# Patient Record
Sex: MALE | Race: BLACK OR AFRICAN AMERICAN | NOT HISPANIC OR LATINO | Employment: FULL TIME | URBAN - METROPOLITAN AREA
[De-identification: names, ages, dates, MRNs, and addresses within clinical notes are randomized per-mention and may not be internally consistent; named-entity substitution may affect disease eponyms.]

---

## 2019-03-12 ENCOUNTER — OFFICE VISIT - HEALTHEAST (OUTPATIENT)
Dept: FAMILY MEDICINE | Facility: CLINIC | Age: 54
End: 2019-03-12

## 2019-03-12 DIAGNOSIS — H81.10 BENIGN PAROXYSMAL POSITIONAL VERTIGO, UNSPECIFIED LATERALITY: ICD-10-CM

## 2019-03-12 DIAGNOSIS — R42 DIZZINESS: ICD-10-CM

## 2019-03-12 LAB
ANION GAP SERPL CALCULATED.3IONS-SCNC: 12 MMOL/L (ref 5–18)
ATRIAL RATE - MUSE: 49 BPM
BASOPHILS # BLD AUTO: 0 THOU/UL (ref 0–0.2)
BASOPHILS NFR BLD AUTO: 0 % (ref 0–2)
BUN SERPL-MCNC: 14 MG/DL (ref 8–22)
CHLORIDE BLD-SCNC: 102 MMOL/L (ref 98–107)
CO2 SERPL-SCNC: 28 MMOL/L (ref 22–31)
CREAT SERPL-MCNC: 0.7 MG/DL (ref 0.8–1.5)
DIASTOLIC BLOOD PRESSURE - MUSE: NORMAL MMHG
EOSINOPHIL # BLD AUTO: 0.2 THOU/UL (ref 0–0.4)
EOSINOPHIL NFR BLD AUTO: 3 % (ref 0–6)
ERYTHROCYTE [DISTWIDTH] IN BLOOD BY AUTOMATED COUNT: 12.4 % (ref 11–14.5)
GLUCOSE BLD-MCNC: 82 MG/DL (ref 70–125)
HCT VFR BLD AUTO: 43.6 % (ref 40–54)
HGB BLD-MCNC: 15 G/DL (ref 14–18)
INTERPRETATION ECG - MUSE: NORMAL
LYMPHOCYTES # BLD AUTO: 1.8 THOU/UL (ref 0.8–4.4)
LYMPHOCYTES NFR BLD AUTO: 30 % (ref 20–40)
MCH RBC QN AUTO: 30.6 PG (ref 27–34)
MCHC RBC AUTO-ENTMCNC: 34.4 G/DL (ref 32–36)
MCV RBC AUTO: 89 FL (ref 80–100)
MONOCYTES # BLD AUTO: 0.5 THOU/UL (ref 0–0.9)
MONOCYTES NFR BLD AUTO: 9 % (ref 2–10)
NEUTROPHILS # BLD AUTO: 3.6 THOU/UL (ref 2–7.7)
NEUTROPHILS NFR BLD AUTO: 58 % (ref 50–70)
P AXIS - MUSE: 34 DEGREES
PLATELET # BLD AUTO: 205 THOU/UL (ref 140–440)
PMV BLD AUTO: 8.7 FL (ref 7–10)
POTASSIUM BLD-SCNC: 4 MMOL/L (ref 3.5–5.5)
PR INTERVAL - MUSE: 166 MS
QRS DURATION - MUSE: 88 MS
QT - MUSE: 432 MS
QTC - MUSE: 390 MS
R AXIS - MUSE: -9 DEGREES
RBC # BLD AUTO: 4.89 MILL/UL (ref 4.4–6.2)
SODIUM SERPL-SCNC: 142 MMOL/L (ref 136–145)
SYSTOLIC BLOOD PRESSURE - MUSE: NORMAL MMHG
T AXIS - MUSE: 6 DEGREES
VENTRICULAR RATE- MUSE: 49 BPM
WBC: 6.2 THOU/UL (ref 4–11)

## 2019-03-12 RX ORDER — ACETAMINOPHEN 325 MG/1
650 TABLET ORAL EVERY 6 HOURS PRN
Status: SHIPPED | COMMUNITY
Start: 2019-03-12 | End: 2021-09-24

## 2019-03-21 ENCOUNTER — OFFICE VISIT - HEALTHEAST (OUTPATIENT)
Dept: FAMILY MEDICINE | Facility: CLINIC | Age: 54
End: 2019-03-21

## 2019-03-21 DIAGNOSIS — Z12.11 SCREEN FOR COLON CANCER: ICD-10-CM

## 2019-03-21 DIAGNOSIS — R42 VERTIGO: ICD-10-CM

## 2019-03-21 ASSESSMENT — MIFFLIN-ST. JEOR: SCORE: 1589.32

## 2021-06-02 ENCOUNTER — RECORDS - HEALTHEAST (OUTPATIENT)
Dept: ADMINISTRATIVE | Facility: CLINIC | Age: 56
End: 2021-06-02

## 2021-06-02 VITALS — WEIGHT: 175 LBS | BODY MASS INDEX: 26.52 KG/M2 | HEIGHT: 68 IN

## 2021-06-02 VITALS — WEIGHT: 171.38 LBS

## 2021-06-17 NOTE — PATIENT INSTRUCTIONS - HE
Patient Instructions by Sacha Reyez PA-C at 3/12/2019 10:00 AM     Author: Sacha Reyez PA-C Service: -- Author Type: Physician Assistant    Filed: 3/12/2019 11:07 AM Encounter Date: 3/12/2019 Status: Addendum    : Sacha Reyez PA-C (Physician Assistant)    Related Notes: Original Note by Sacha Reyez PA-C (Physician Assistant) filed at 3/12/2019 11:07 AM       Take Antivert medication as prescribed.  Follow-up with the vertigo clinic for treatment.  Establish care with primary care provider by making an appointment before leaving the office today.  Return to walk-in care or with your primary care provider if not getting good resolution of your symptoms or if new concerns arise.      Patient Education     Benign Paroxysmal Positional Vertigo     Your health care provider may move your head in certain ways to treat your BPPV.     Benign paroxysmal positional vertigo (BPPV) is a problem with the inner ear. The inner ear contains the vestibular system. This system is what helps you keep your balance. BPPV causes a feeling of spinning. It is a common problem of the vestibular system.  Understanding the vestibular system  The vestibular system of the ear is made up of very tiny parts. They include the utricle, saccule, and semicircular canals. The utricle is a tiny organ that contains calcium crystals. In some people, the crystals can move into the semicircular canals. When this happens, the system no longer works as it should. This causes BPPV. Benign means it is not life threatening. Paroxysmal means it happens suddenly. Positional means that it happens when you move your head. Vertigo is a feeling of spinning.  What causes BPPV?  Causes include injury to your head or neck. Other problems with the vestibular system may cause BPPV. In many people, the cause of BPPV is not known.  Symptoms of BPPV  You many have repeated feelings of spinning (vertigo). The vertigo usually lasts less than 1 minute. Some  movements, such as rolling over in bed, can bring on vertigo.  Diagnosing BPPV  Your primary healthcare provider may diagnose and treat your BPPV. Or you may see an ear, nose, and throat doctor (otolaryngologist). In some cases, you may see a nervous system doctor (neurologist).  The healthcare provider will ask about your symptoms and your medical history. He or she will examine you. You may have hearing and balance tests. As part of the exam, your healthcare provider may have you move your head and body in certain ways. If you have BPPV, the movements can bring on vertigo. Your provider will also look for abnormal movements of your eyes. You may have other tests to check your vestibular or nervous systems.  Treatment for BPPV  Your healthcare provider may try to move the calcium crystals. This is done by having you move your head and neck in certain ways. This treatment is safe and often works well. You may also be told to do these movements at home. You may still have vertigo for a few weeks. Your healthcare provider will recheck your symptoms, usually in about a month. Special physical therapy may also be part of treatment. In rare cases, surgery may be needed for BPPV that does not go away.     When to call the healthcare provider  Call your healthcare provider right away if you have any of these:    Symptoms that do not go away with treatment    Symptoms that get worse    New symptoms   Date Last Reviewed: 5/1/2017 2000-2017 [employer]. [address] [place] [address]. All rights reserved. This information is not intended as a substitute for professional medical care. Always follow your healthcare professional's instructions.           Patient Education     Dizziness (Uncertain Cause)  Dizziness is a common symptom. It may be described as lightheadedness, spinning, or feeling like you are going to faint. Dizziness can have many causes.  Be sure to tell the healthcare provider  about:    All medicines you take, including prescription, over-the-counter, herbs, and supplements    Any other symptoms you have    Any health problems you are being treated for    Any past major health problems you've had, such as a heart attack, balance issues, hearing problems, or blood pressure problems    Anything that causes the dizziness to get worse or better  Today's exam did not show an exact cause for your dizziness. Other tests may be needed. Follow up with your healthcare provider.  Home care    Dizziness that occurs with sudden standing may be a sign of mild dehydration. Drink extra fluids for the next few days.    If you recently started a new medicine, stopped a medicine, or had the dose of a current medicine changed, talk with the prescribing healthcare provider. Your medicine plan may need adjustment.    If dizziness lasts more than a few seconds, sit or lie down until it passes. This may help prevent injury in case you pass out. Get up slowly when you feel better.    Don't drive or use power tools or dangerous equipment until you have had no dizziness for at least 48 hours.  Follow-up care  Follow up with your healthcare provider for further evaluation within the next 7 days or as advised.  When to seek medical advice  Call your healthcare provider for any of the following:    Worsening of symptoms or new symptoms    Passing out or seizure    Repeated vomiting    Headache    Palpitations (the sense that your heart is fluttering or beating fast or hard)    Shortness of breath    Blood in vomit or stool (black or red color)    Weakness of an arm or leg or 1 side of the face    Vision or hearing changes    Trouble walking or speaking    Chest, arm, neck, back, or jaw pain  Date Last Reviewed: 11/1/2017 2000-2017 [employer]. [address] [place] [address]. All rights reserved. This information is not intended as a substitute for professional medical care. Always follow  your healthcare professional's instructions.

## 2021-06-18 NOTE — LETTER
Letter by Sacha Reyez PA-C at      Author: Sacha Reyez PA-C Service: -- Author Type: --    Filed:  Encounter Date: 3/12/2019 Status: (Other)       March 12, 2019     Patient: Kaitlin Dejesus   YOB: 1965   Date of Visit: 3/12/2019       To Whom It May Concern:    It is my medical opinion that Kaitlin Dejesus may return to work on 03/14/2019.    If you have any questions or concerns, please don't hesitate to call.    Sincerely,        Electronically signed by Sacha Reyez PA-C

## 2021-06-25 NOTE — PROGRESS NOTES
1. Vertigo     2. Screen for colon cancer  Ambulatory referral for Colonoscopy        Plan: He is doing much better, so I did not refill his meclizine.  He did not think he needed a refill of it.  I told him that is not terribly uncommon for him to have a few symptoms still at this point but over the next couple of weeks that should resolve completely.  If it seems to be getting worse or not improving he will let me know.    I encouraged him to come on in some point in the next couple months and have a full physical fasting so we can do some blood work.    Subjective: 53-year-old male new patient to our clinic who is here today to establish care and to follow-up from [facility].  He was seen a couple of weeks ago at [facility] and he was given some meclizine for some vertigo.  They thought it was a benign vertigo and indeed he has gotten much better.  The meclizine somewhat helped his symptoms but he thinks he is just gotten better given time.  He still once in a while will have symptoms when he quickly turns his head but overall he is 90% better.    He really has no other concerns or problems today.  We reviewed his history and documented things in the chart.    Patient is new patient to the clinic. Please see past medical history, surgical history, social history and family history, all of which were completed in their entirety today.     A comprehensive Review of Systems was performed, and other than the positives mentioned above, the ROS was negative.       Objective: Well-appearing male in no acute distress.  Vital signs as noted.  He really does not get vertiginous with positional challenges.  He is ears are clear eyes and nose are normal.  Oropharynx is clear.  Chest clear to auscultation.  Heart regular rate and rhythm.

## 2021-06-27 NOTE — PROGRESS NOTES
Progress Notes by Sacha Reyez PA-C at 3/12/2019 10:00 AM     Author: Sacha Reyez PA-C Service: -- Author Type: Physician Assistant    Filed: 3/12/2019  2:47 PM Encounter Date: 3/12/2019 Status: Signed    : Sacha Reyez PA-C (Physician Assistant)       Subjective:      Patient ID: Kaitlin Dejesus is a 53 y.o. male.    Chief Complaint:    HPI  Kaitlin Dejesus is a 53 y.o. male who presents today complaining of dizziness and vomiting since last night. He had one bout of emesis last night.  He has not had emesis today.  Is not had a fever.  No cardiac symptoms to include chest pain arm or jaw pain shortness of breath dyspnea on exertion.  She has not had difficulty with dizziness in the past.  No known history of hypertension or hyperlipidemia.     Vertigo  Patient presents for evaluation of dizziness. The symptoms started yesterday and have essentially resolved. The attacks occur occasionally and last a few seconds. Positions that worsen symptoms: turning head. Previous workup/treatments: none. Associated ear symptoms: none. Associated CNS symptoms: none. Recent infections: none. Head trauma: denied. Drug ingestion: none and caffiene. Noise exposure: no occupational exposure. Family history: None.    He has not tried treatment for this at home.    He does not drink very much water during the day.  However, he is not been describing dizziness with changing position from lying to sitting to standing.    No past medical history on file.    No past surgical history on file.    No family history on file.    Social History     Tobacco Use   ? Smoking status: Never Smoker   ? Smokeless tobacco: Never Used   Substance Use Topics   ? Alcohol use: Not on file   ? Drug use: Not on file       Review of Systems  As above in HPI, otherwise balance of Review of Systems are negative.    Objective:     /80 (Patient Site: Right Arm, Patient Position: Sitting, Cuff Size: Adult Regular)   Pulse (!) 53   Temp  98.3  F (36.8  C) (Oral)   Resp 16   Wt 171 lb 6 oz (77.7 kg)   SpO2 97%     Physical Exam  General: Patient is resting comfortably no acute distress is afebrile  HEENT: Head is normocephalic atraumatic   eyes are PERRL EOMI sclera anicteric   TMs are clear bilaterally  Throat is with mild pharyngeal wall erythema and no exudate  No cervical lymphadenopathy present  LUNGS: Clear to auscultation bilaterally  HEART: Regular rate and rhythm  Skin: Without rash non-diaphoretic  Neuro: Cranial nerves II through XII are grossly intact.  There is both direct and consensual stimulation to light.  No noted nystagmus.  Ears are clear.  He has good finger to nose and heel to villagomez testing.  Romberg is negative.  No focal neurologic deficits noted    Lab:  Recent Results (from the past 24 hour(s))   ISTAT CHEM 7 (HE CLINIC ONLY)   Result Value Ref Range    Sodium 142 136 - 145 mmol/L    Potassium 4.0 3.5 - 5.5 mmol/L    CO2 28 22 - 31 mmol/L    Chloride 102 98 - 107 mmol/L    Anion Gap, Calculation 12 5 - 18 mmol/L    Glucose 82 70 - 125 mg/dL    BUN 14 8 - 22 mg/dL    Creatinine 0.70 (L) 0.80 - 1.50 mg/dL   HM1 (CBC with Diff)   Result Value Ref Range    WBC 6.2 4.0 - 11.0 thou/uL    RBC 4.89 4.40 - 6.20 mill/uL    Hemoglobin 15.0 14.0 - 18.0 g/dL    Hematocrit 43.6 40.0 - 54.0 %    MCV 89 80 - 100 fL    MCH 30.6 27.0 - 34.0 pg    MCHC 34.4 32.0 - 36.0 g/dL    RDW 12.4 11.0 - 14.5 %    Platelets 205 140 - 440 thou/uL    MPV 8.7 7.0 - 10.0 fL    Neutrophils % 58 50 - 70 %    Lymphocytes % 30 20 - 40 %    Monocytes % 9 2 - 10 %    Eosinophils % 3 0 - 6 %    Basophils % 0 0 - 2 %    Neutrophils Absolute 3.6 2.0 - 7.7 thou/uL    Lymphocytes Absolute 1.8 0.8 - 4.4 thou/uL    Monocytes Absolute 0.5 0.0 - 0.9 thou/uL    Eosinophils Absolute 0.2 0.0 - 0.4 thou/uL    Basophils Absolute 0.0 0.0 - 0.2 thou/uL   Electrocardiogram Perform - Clinic   Result Value Ref Range    SYSTOLIC BLOOD PRESSURE  mmHg    DIASTOLIC BLOOD PRESSURE  mmHg     VENTRICULAR RATE 49 BPM    ATRIAL RATE 49 BPM    P-R INTERVAL 166 ms    QRS DURATION 88 ms    Q-T INTERVAL 432 ms    QTC CALCULATION (BEZET) 390 ms    P Axis 34 degrees    R AXIS -9 degrees    T AXIS 6 degrees    MUSE DIAGNOSIS       Sinus bradycardia  Leftward axis  Minimal voltage criteria for LVH, may be normal variant  Borderline ECG  No previous ECGs available  Confirmed by DEVON MARTÍNEZ MD LOC:[facility] ([id number]) on 3/12/2019 12:06:34 PM         Assessment:     Procedures    The primary encounter diagnosis was Dizziness. A diagnosis of Benign paroxysmal positional vertigo, unspecified laterality was also pertinent to this visit.    Plan:     1. Dizziness  HM1(CBC and Differential)    ISTAT CHEM 7 (HE CLINIC ONLY)    Electrocardiogram Perform - Clinic    HM1 (CBC with Diff)    meclizine (ANTIVERT) 12.5 mg tablet   2. Benign paroxysmal positional vertigo, unspecified laterality  Ambulatory referral to Adult PT- Internal    meclizine (ANTIVERT) 12.5 mg tablet       Advised patient that we will treat him with Antivert for a BPPV type picture since he is having dizziness with turning of his head.  He has not had any other cardiac symptoms to report.  His EKG appeared to be normal.  Requested he try the Antivert, have treatment and evaluation by the physical therapy vertigo clinic and will establish care with primary care provider to recheck his progress.  He can return to the walk-in care in the interim if any complication or new concerns arise.    Patient Instructions     Take Antivert medication as prescribed.  Follow-up with the vertigo clinic for treatment.  Establish care with primary care provider by making an appointment before leaving the office today.  Return to walk-in care or with your primary care provider if not getting good resolution of your symptoms or if new concerns arise.      Patient Education     Benign Paroxysmal Positional Vertigo     Your health care provider may move your head in certain ways to  treat your BPPV.     Benign paroxysmal positional vertigo (BPPV) is a problem with the inner ear. The inner ear contains the vestibular system. This system is what helps you keep your balance. BPPV causes a feeling of spinning. It is a common problem of the vestibular system.  Understanding the vestibular system  The vestibular system of the ear is made up of very tiny parts. They include the utricle, saccule, and semicircular canals. The utricle is a tiny organ that contains calcium crystals. In some people, the crystals can move into the semicircular canals. When this happens, the system no longer works as it should. This causes BPPV. Benign means it is not life threatening. Paroxysmal means it happens suddenly. Positional means that it happens when you move your head. Vertigo is a feeling of spinning.  What causes BPPV?  Causes include injury to your head or neck. Other problems with the vestibular system may cause BPPV. In many people, the cause of BPPV is not known.  Symptoms of BPPV  You many have repeated feelings of spinning (vertigo). The vertigo usually lasts less than 1 minute. Some movements, such as rolling over in bed, can bring on vertigo.  Diagnosing BPPV  Your primary healthcare provider may diagnose and treat your BPPV. Or you may see an ear, nose, and throat doctor (otolaryngologist). In some cases, you may see a nervous system doctor (neurologist).  The healthcare provider will ask about your symptoms and your medical history. He or she will examine you. You may have hearing and balance tests. As part of the exam, your healthcare provider may have you move your head and body in certain ways. If you have BPPV, the movements can bring on vertigo. Your provider will also look for abnormal movements of your eyes. You may have other tests to check your vestibular or nervous systems.  Treatment for BPPV  Your healthcare provider may try to move the calcium crystals. This is done by having you move your  head and neck in certain ways. This treatment is safe and often works well. You may also be told to do these movements at home. You may still have vertigo for a few weeks. Your healthcare provider will recheck your symptoms, usually in about a month. Special physical therapy may also be part of treatment. In rare cases, surgery may be needed for BPPV that does not go away.     When to call the healthcare provider  Call your healthcare provider right away if you have any of these:    Symptoms that do not go away with treatment    Symptoms that get worse    New symptoms   Date Last Reviewed: 5/1/2017 2000-2017 [employer]. [address] [place] [address]. All rights reserved. This information is not intended as a substitute for professional medical care. Always follow your healthcare professional's instructions.           Patient Education     Dizziness (Uncertain Cause)  Dizziness is a common symptom. It may be described as lightheadedness, spinning, or feeling like you are going to faint. Dizziness can have many causes.  Be sure to tell the healthcare provider about:    All medicines you take, including prescription, over-the-counter, herbs, and supplements    Any other symptoms you have    Any health problems you are being treated for    Any past major health problems you've had, such as a heart attack, balance issues, hearing problems, or blood pressure problems    Anything that causes the dizziness to get worse or better  Today's exam did not show an exact cause for your dizziness. Other tests may be needed. Follow up with your healthcare provider.  Home care    Dizziness that occurs with sudden standing may be a sign of mild dehydration. Drink extra fluids for the next few days.    If you recently started a new medicine, stopped a medicine, or had the dose of a current medicine changed, talk with the prescribing healthcare provider. Your medicine plan may need adjustment.    If  dizziness lasts more than a few seconds, sit or lie down until it passes. This may help prevent injury in case you pass out. Get up slowly when you feel better.    Don't drive or use power tools or dangerous equipment until you have had no dizziness for at least 48 hours.  Follow-up care  Follow up with your healthcare provider for further evaluation within the next 7 days or as advised.  When to seek medical advice  Call your healthcare provider for any of the following:    Worsening of symptoms or new symptoms    Passing out or seizure    Repeated vomiting    Headache    Palpitations (the sense that your heart is fluttering or beating fast or hard)    Shortness of breath    Blood in vomit or stool (black or red color)    Weakness of an arm or leg or 1 side of the face    Vision or hearing changes    Trouble walking or speaking    Chest, arm, neck, back, or jaw pain  Date Last Reviewed: 11/1/2017 2000-2017 [employer]. [address] [place] [address], [place] [address]. All rights reserved. This information is not intended as a substitute for professional medical care. Always follow your healthcare professional's instructions.

## 2021-09-07 ENCOUNTER — OFFICE VISIT (OUTPATIENT)
Dept: FAMILY MEDICINE | Facility: CLINIC | Age: 56
End: 2021-09-07
Payer: COMMERCIAL

## 2021-09-07 VITALS
BODY MASS INDEX: 30.17 KG/M2 | WEIGHT: 176.7 LBS | HEIGHT: 64 IN | SYSTOLIC BLOOD PRESSURE: 100 MMHG | HEART RATE: 59 BPM | OXYGEN SATURATION: 96 % | DIASTOLIC BLOOD PRESSURE: 78 MMHG

## 2021-09-07 DIAGNOSIS — B35.6 TINEA OF GROIN: ICD-10-CM

## 2021-09-07 DIAGNOSIS — Z22.7 LATENT TUBERCULOSIS BY SKIN TEST: Primary | ICD-10-CM

## 2021-09-07 LAB
ALBUMIN SERPL-MCNC: 4 G/DL (ref 3.5–5)
ALP SERPL-CCNC: 92 U/L (ref 45–120)
ALT SERPL W P-5'-P-CCNC: 20 U/L (ref 0–45)
AST SERPL W P-5'-P-CCNC: 23 U/L (ref 0–40)
BILIRUB DIRECT SERPL-MCNC: 0.2 MG/DL
BILIRUB SERPL-MCNC: 0.7 MG/DL (ref 0–1)
PROT SERPL-MCNC: 7.2 G/DL (ref 6–8)

## 2021-09-07 PROCEDURE — 80076 HEPATIC FUNCTION PANEL: CPT | Performed by: FAMILY MEDICINE

## 2021-09-07 PROCEDURE — 36415 COLL VENOUS BLD VENIPUNCTURE: CPT | Performed by: FAMILY MEDICINE

## 2021-09-07 PROCEDURE — 99214 OFFICE O/P EST MOD 30 MIN: CPT | Performed by: FAMILY MEDICINE

## 2021-09-07 RX ORDER — ISONIAZID 100 MG/1
100 TABLET ORAL DAILY
Status: CANCELLED | OUTPATIENT
Start: 2021-09-07

## 2021-09-07 RX ORDER — RIFAMPIN 150 MG/1
150 CAPSULE ORAL DAILY
Qty: 30 CAPSULE | Refills: 0 | Status: SHIPPED | OUTPATIENT
Start: 2021-09-07 | End: 2021-09-24

## 2021-09-07 RX ORDER — KETOCONAZOLE 20 MG/G
CREAM TOPICAL 2 TIMES DAILY
Qty: 60 G | Refills: 1 | Status: SHIPPED | OUTPATIENT
Start: 2021-09-07 | End: 2022-01-11

## 2021-09-07 RX ORDER — ISONIAZID 300 MG/1
300 TABLET ORAL DAILY
Qty: 30 TABLET | Refills: 0 | Status: SHIPPED | OUTPATIENT
Start: 2021-09-07 | End: 2021-09-24

## 2021-09-07 RX ORDER — BETAMETHASONE VALERATE 1.2 MG/G
CREAM TOPICAL 2 TIMES DAILY
Qty: 45 G | Refills: 1 | Status: SHIPPED | OUTPATIENT
Start: 2021-09-07 | End: 2022-01-11

## 2021-09-07 ASSESSMENT — ENCOUNTER SYMPTOMS
LIGHT-HEADEDNESS: 0
UNEXPECTED WEIGHT CHANGE: 0
CHILLS: 0
WHEEZING: 0
SHORTNESS OF BREATH: 0
HEADACHES: 0
FATIGUE: 0
COUGH: 0

## 2021-09-07 ASSESSMENT — MIFFLIN-ST. JEOR: SCORE: 1537.51

## 2021-09-07 NOTE — LETTER
September 8, 2021      KeyannaAlina Dejesus  [address] [place] [id number]        Dear ,    We are writing to inform you of your test results.    liver function tests were normal.       Resulted Orders   Hepatic panel (Albumin, ALT, AST, Bili, Alk Phos, TP)   Result Value Ref Range    Bilirubin Total 0.7 0.0 - 1.0 mg/dL    Bilirubin Direct 0.2 <=0.5 mg/dL    Protein Total 7.2 6.0 - 8.0 g/dL    Albumin 4.0 3.5 - 5.0 g/dL    Alkaline Phosphatase 92 45 - 120 U/L    AST 23 0 - 40 U/L    ALT 20 0 - 45 U/L       If you have any questions or concerns, please call the clinic at the number listed above.       Sincerely,      Layo Bo MD

## 2021-09-07 NOTE — PROGRESS NOTES
"    Assessment & Plan     Latent tuberculosis by skin test  - XR Chest 2 Views  - Med Therapy Management Referral  - Hepatic panel (Albumin, ALT, AST, Bili, Alk Phos, TP)  - isoniazid (NYDRAZID) 300 MG tablet  Dispense: 30 tablet; Refill: 0  - rifampin (RIFADIN) 150 MG capsule  Dispense: 30 capsule; Refill: 0    Tinea of groin  - ketoconazole (NIZORAL) 2 % external cream  Dispense: 60 g; Refill: 1  - betamethasone valerate (VALISONE) 0.1 % external cream  Dispense: 45 g; Refill: 1  I did start him on isoniazid as well as rifampin.  Will have him come back in 1 month for a recheck.  I will also have him do a chest x-ray and check his liver function.  I did put in a referral for MTM for monitoring of the isoniazid as well as the rifampin.  The rash that he has appears to be tinea and I am going to go ahead and have him treated with ketoconazole as well as betamethasone.  We will recheck it again when he comes in for his a follow-up.  But he will let me know if there is any changes or if there is no improvement.  BMI:   Estimated body mass index is 30.33 kg/m  as calculated from the following:    Height as of this encounter: 1.626 m (5' 4\").    Weight as of this encounter: 80.2 kg (176 lb 11.2 oz).           No follow-ups on file.    Layo Bo MD  [facility]   Kaitlin is a 55 year old who presents for the following health issues     HPI    He comes in today having done a Mantoux for TB screening which came back positive.  He noted that he has had Mantoux in the past and that had always been negative.  He noted no cough, he does not have any increased sweating does not have night sweats.  He has had no weight loss and no hemoptysis.  Noted that he thinks that he must have received BCG when he was younger.  He was informed to come in for management.  He is also having some skin rash noted on the left axillary region as well as on the groin on both sides.  Noted no " itching but has had this going on for some time now.  Noted to have that managed and treated.    Family History   Problem Relation Age of Onset     Diabetes No family hx of      Heart Disease No family hx of      Cancer No family hx of       Social History     Socioeconomic History     Marital status:      Spouse name: Not on file     Number of children: Not on file     Years of education: Not on file     Highest education level: Not on file   Occupational History     Not on file   Tobacco Use     Smoking status: Never Smoker     Smokeless tobacco: Never Used   Substance and Sexual Activity     Alcohol use: No     Drug use: No     Sexual activity: Yes     Partners: Female   Other Topics Concern     Not on file   Social History Narrative     Not on file     Social Determinants of Health     Financial Resource Strain:      Difficulty of Paying Living Expenses:    Food Insecurity:      Worried About Running Out of Food in the Last Year:      Ran Out of Food in the Last Year:    Transportation Needs:      Lack of Transportation (Medical):      Lack of Transportation (Non-Medical):    Physical Activity:      Days of Exercise per Week:      Minutes of Exercise per Session:    Stress:      Feeling of Stress :    Social Connections:      Frequency of Communication with Friends and Family:      Frequency of Social Gatherings with Friends and Family:      Attends Orthodoxy Services:      Active Member of Clubs or Organizations:      Attends Club or Organization Meetings:      Marital Status:    Intimate Partner Violence:      Fear of Current or Ex-Partner:      Emotionally Abused:      Physically Abused:      Sexually Abused:       No past surgical history on file.   No past medical history on file.     Review of Systems   Constitutional: Negative for chills, fatigue and unexpected weight change.   HENT: Negative.    Respiratory: Negative for cough, shortness of breath and wheezing.    Cardiovascular: Negative for  "chest pain.   Skin: Positive for rash.   Neurological: Negative for light-headedness and headaches.   All other systems reviewed and are negative.           Objective    /78 (BP Location: Left arm, Patient Position: Sitting, Cuff Size: Adult Regular)   Pulse 59   Ht 1.626 m (5' 4\")   Wt 80.2 kg (176 lb 11.2 oz)   SpO2 96%   BMI 30.33 kg/m    Body mass index is 30.33 kg/m .  Physical Exam  Constitutional:       Appearance: Normal appearance.   Cardiovascular:      Pulses: Normal pulses.   Pulmonary:      Effort: Pulmonary effort is normal.   Abdominal:      General: Abdomen is flat.   Musculoskeletal:      Cervical back: Normal range of motion.   Skin:     General: Skin is warm.      Comments: He has a dried looking flaky and rounded skin rash noted on the medial aspect of the upper arm involving the area of the biceps.  He also has same on the groin region bilaterally.   Neurological:      Mental Status: He is alert.   Psychiatric:         Mood and Affect: Mood normal.        "

## 2021-09-08 ENCOUNTER — ANCILLARY PROCEDURE (OUTPATIENT)
Dept: GENERAL RADIOLOGY | Facility: CLINIC | Age: 56
End: 2021-09-08
Attending: FAMILY MEDICINE
Payer: COMMERCIAL

## 2021-09-08 DIAGNOSIS — Z22.7 LATENT TUBERCULOSIS BY SKIN TEST: ICD-10-CM

## 2021-09-08 PROCEDURE — 71046 X-RAY EXAM CHEST 2 VIEWS: CPT | Mod: TC | Performed by: RADIOLOGY

## 2021-09-24 ENCOUNTER — OFFICE VISIT (OUTPATIENT)
Dept: PHARMACY | Facility: CLINIC | Age: 56
End: 2021-09-24
Attending: FAMILY MEDICINE
Payer: COMMERCIAL

## 2021-09-24 DIAGNOSIS — Z22.7 LATENT TUBERCULOSIS BY SKIN TEST: ICD-10-CM

## 2021-09-24 DIAGNOSIS — Z22.7 LATENT TUBERCULOSIS: Primary | ICD-10-CM

## 2021-09-24 PROCEDURE — 99207 PR NO CHARGE LOS: CPT | Performed by: PHARMACIST

## 2021-09-24 RX ORDER — ISONIAZID 300 MG/1
300 TABLET ORAL DAILY
Qty: 30 TABLET | Refills: 2 | Status: SHIPPED | OUTPATIENT
Start: 2021-09-24 | End: 2021-12-28

## 2021-09-24 RX ORDER — RIFAMPIN 150 MG/1
150 CAPSULE ORAL DAILY
Qty: 30 CAPSULE | Refills: 2 | Status: SHIPPED | OUTPATIENT
Start: 2021-09-24 | End: 2021-12-28

## 2021-09-24 NOTE — PROGRESS NOTES
Patient presents for medication monitoring of treatment of latent tuberculosis. He was prescribed three month regimen of daily dosing of isoniazid with rifampin (3HR). This is month one out of three.  Patient verifies taking medication as prescribed, with no missed doses so far.  Patient denies any side effects from the medication and these were reviewed as listed below along with other teaching points.  Patient verifies understanding importance of completing regimen. Scheduled follow-up in 1 month.    POTENTIAL SIDE EFFECTS:   Poor appetite (INH/RIF)  Nausea/vomiting (INH/RIF)  RUQ abdominal tenderness (INH/RIF)  Tea/coffee colored urine (INH/RIF)  Unusual fatigue (INH/RIF)  Rash/itching (INH/RIF)  Yellow skin/eyes (INH/RIF)  Numbness/tingling in arms/legs (INH)  Fever for 3 days (INH/RIF)    TEACHING:  Notify MD/nurse if side effects  Review signs/symptoms of active TB disease  Avoid alcohol use.  Take meds without food (better absorption)  Effect on hormonal contraceptives (RIF)  Orange urine/tears are normal (RIF)  Importance of notifying providers if moving  Importance to complete full regimen      Return in about 1 month (around 10/24/2021).    Karin Ge, PharmD, BCACP  Medication Management (MTM) Pharmacist  [facility]     Current Outpatient Medications   Medication     acetaminophen (TYLENOL) 325 MG tablet     betamethasone valerate (VALISONE) 0.1 % external cream     isoniazid (NYDRAZID) 300 MG tablet     ketoconazole (NIZORAL) 2 % external cream     rifampin (RIFADIN) 150 MG capsule     No current facility-administered medications for this visit.

## 2021-10-20 NOTE — PROGRESS NOTES
Patient presents for medication monitoring of treatment of latent tuberculosis. He was prescribed three month regimen of daily dosing of isoniazid with rifampin (3HR). This is month two out of three.  Patient verifies taking medication as prescribed, with no missed doses so far.  Patient denies any side effects from the medication and these were reviewed as listed below along with other teaching points.  Patient verifies understanding importance of completing regimen. Scheduled follow-up in 1 month.     POTENTIAL SIDE EFFECTS:   Poor appetite (INH/RIF)  Nausea/vomiting (INH/RIF)  RUQ abdominal tenderness (INH/RIF)  Tea/coffee colored urine (INH/RIF)  Unusual fatigue (INH/RIF)  Rash/itching (INH/RIF)  Yellow skin/eyes (INH/RIF)  Numbness/tingling in arms/legs (INH)  Fever for 3 days (INH/RIF)    TEACHING:  Notify MD/nurse if side effects  Review signs/symptoms of active TB disease  Avoid alcohol use.  Take meds without food (better absorption)  Effect on hormonal contraceptives (RIF)  Orange urine/tears are normal (RIF)  Importance of notifying providers if moving  Importance to complete full regimen      Return in about 1 month (around 11/22/2021).    Karin Ge, PharmD, BCACP  Medication Management (MTM) Pharmacist  [facility]     Current Outpatient Medications   Medication     betamethasone valerate (VALISONE) 0.1 % external cream     isoniazid (NYDRAZID) 300 MG tablet     ketoconazole (NIZORAL) 2 % external cream     rifampin (RIFADIN) 150 MG capsule     No current facility-administered medications for this visit.

## 2021-10-22 ENCOUNTER — OFFICE VISIT (OUTPATIENT)
Dept: PHARMACY | Facility: CLINIC | Age: 56
End: 2021-10-22
Payer: COMMERCIAL

## 2021-10-22 ENCOUNTER — OFFICE VISIT (OUTPATIENT)
Dept: FAMILY MEDICINE | Facility: CLINIC | Age: 56
End: 2021-10-22
Payer: COMMERCIAL

## 2021-10-22 VITALS
SYSTOLIC BLOOD PRESSURE: 120 MMHG | WEIGHT: 180.5 LBS | BODY MASS INDEX: 30.81 KG/M2 | DIASTOLIC BLOOD PRESSURE: 80 MMHG | OXYGEN SATURATION: 97 % | HEIGHT: 64 IN | HEART RATE: 62 BPM

## 2021-10-22 VITALS
SYSTOLIC BLOOD PRESSURE: 120 MMHG | DIASTOLIC BLOOD PRESSURE: 80 MMHG | OXYGEN SATURATION: 97 % | HEART RATE: 62 BPM | WEIGHT: 180.5 LBS | BODY MASS INDEX: 30.98 KG/M2

## 2021-10-22 DIAGNOSIS — Z22.7 LATENT TUBERCULOSIS BY SKIN TEST: Primary | ICD-10-CM

## 2021-10-22 DIAGNOSIS — Z22.7 LATENT TUBERCULOSIS: Primary | ICD-10-CM

## 2021-10-22 DIAGNOSIS — Z23 TETANUS-DIPHTHERIA (TD) VACCINATION: ICD-10-CM

## 2021-10-22 LAB
ALBUMIN SERPL-MCNC: 4.1 G/DL (ref 3.5–5)
ALP SERPL-CCNC: 88 U/L (ref 45–120)
ALT SERPL W P-5'-P-CCNC: 20 U/L (ref 0–45)
AST SERPL W P-5'-P-CCNC: 25 U/L (ref 0–40)
BILIRUB DIRECT SERPL-MCNC: 0.2 MG/DL
BILIRUB SERPL-MCNC: 0.5 MG/DL (ref 0–1)
PROT SERPL-MCNC: 7.6 G/DL (ref 6–8)

## 2021-10-22 PROCEDURE — 99207 PR NO CHARGE LOS: CPT | Performed by: PHARMACIST

## 2021-10-22 PROCEDURE — 90714 TD VACC NO PRESV 7 YRS+ IM: CPT | Performed by: FAMILY MEDICINE

## 2021-10-22 PROCEDURE — 99212 OFFICE O/P EST SF 10 MIN: CPT | Mod: 25 | Performed by: FAMILY MEDICINE

## 2021-10-22 PROCEDURE — 36415 COLL VENOUS BLD VENIPUNCTURE: CPT | Performed by: FAMILY MEDICINE

## 2021-10-22 PROCEDURE — 80076 HEPATIC FUNCTION PANEL: CPT | Performed by: FAMILY MEDICINE

## 2021-10-22 PROCEDURE — 90471 IMMUNIZATION ADMIN: CPT | Performed by: FAMILY MEDICINE

## 2021-10-22 ASSESSMENT — ENCOUNTER SYMPTOMS
HEARTBURN: 0
CONSTITUTIONAL NEGATIVE: 1
ABDOMINAL PAIN: 0
DIARRHEA: 0
COUGH: 0
ADENOPATHY: 0

## 2021-10-22 ASSESSMENT — MIFFLIN-ST. JEOR: SCORE: 1554.74

## 2021-10-22 NOTE — PROGRESS NOTES
"  Assessment & Plan     (Z22.7) Latent tuberculosis by skin test  (primary encounter diagnosis)  Comment: He will continue to take the isoniazid as well as the rifampicin for at least 3 months.  But he is doing well at this time.  I will look at getting his liver function.  Plan: Hepatic panel (Albumin, ALT, AST, Bili, Alk         Phos, TP)      (Z23) Tetanus-diphtheria (Td) vaccination  Comment: He does need tetanus vaccination.  This will be done today.  Plan: TD PRESERV FREE, IM (7+ YRS) (DECAVAC/TENIVAC)          BMI:   Estimated body mass index is 30.98 kg/m  as calculated from the following:    Height as of this encounter: 1.626 m (5' 4\").    Weight as of this encounter: 81.9 kg (180 lb 8 oz).           No follow-ups on file.    Layo Bo MD  [facility] DANI Madrigal is a 55 year old who presents for the following health issues     HPI   He comes in today to follow-up.  He is following up with me with the [facility] pharmacist Karin Ge.  He has been treated for latent tuberculosis.  He is not taking isoniazid and rifampin.  He noted no side effects to the medications.  He has had no cough or difficulty breathing in the past.  Today he feels well.  He does need to have his tetanus shot updated.    Family History   Problem Relation Age of Onset     Diabetes No family hx of      Heart Disease No family hx of      Cancer No family hx of       Social History     Socioeconomic History     Marital status:      Spouse name: Not on file     Number of children: Not on file     Years of education: Not on file     Highest education level: Not on file   Occupational History     Not on file   Tobacco Use     Smoking status: Never Smoker     Smokeless tobacco: Never Used   Substance and Sexual Activity     Alcohol use: No     Drug use: No     Sexual activity: Yes     Partners: Female   Other Topics Concern     Not on file   Social History Narrative     Not on file " "    Social Determinants of Health     Financial Resource Strain:      Difficulty of Paying Living Expenses:    Food Insecurity:      Worried About Running Out of Food in the Last Year:      Ran Out of Food in the Last Year:    Transportation Needs:      Lack of Transportation (Medical):      Lack of Transportation (Non-Medical):    Physical Activity:      Days of Exercise per Week:      Minutes of Exercise per Session:    Stress:      Feeling of Stress :    Social Connections:      Frequency of Communication with Friends and Family:      Frequency of Social Gatherings with Friends and Family:      Attends Oriental orthodox Services:      Active Member of Clubs or Organizations:      Attends Club or Organization Meetings:      Marital Status:    Intimate Partner Violence:      Fear of Current or Ex-Partner:      Emotionally Abused:      Physically Abused:      Sexually Abused:       No past surgical history on file.   No past medical history on file.       Review of Systems   Constitutional: Negative.    Respiratory: Negative for cough.    Cardiovascular: Negative for chest pain.   Gastrointestinal: Negative for abdominal pain, diarrhea and heartburn.   Hematological: Negative for adenopathy.            Objective    /80 (BP Location: Left arm, Patient Position: Sitting, Cuff Size: Adult Regular)   Pulse 62   Ht 1.626 m (5' 4\")   Wt 81.9 kg (180 lb 8 oz)   SpO2 97%   BMI 30.98 kg/m    Body mass index is 30.98 kg/m .  Physical Exam  Vitals reviewed.   Constitutional:       Appearance: Normal appearance.   Cardiovascular:      Rate and Rhythm: Normal rate and regular rhythm.      Pulses: Normal pulses.   Pulmonary:      Effort: Pulmonary effort is normal.      Breath sounds: Normal breath sounds.   Abdominal:      General: Abdomen is flat. There is no distension.      Tenderness: There is no abdominal tenderness.   Neurological:      Mental Status: He is alert.                "

## 2021-11-19 ENCOUNTER — VIRTUAL VISIT (OUTPATIENT)
Dept: PHARMACY | Facility: CLINIC | Age: 56
End: 2021-11-19
Payer: COMMERCIAL

## 2021-11-19 DIAGNOSIS — Z22.7 LATENT TUBERCULOSIS: Primary | ICD-10-CM

## 2021-11-19 PROCEDURE — 99207 PR NO CHARGE LOS: CPT | Performed by: PHARMACIST

## 2021-11-19 NOTE — PROGRESS NOTES
Patient presents for medication monitoring of treatment of latent tuberculosis. He was prescribed three month regimen of daily dosing of isoniazid with rifampin (3HR). This is month three out of three. Last month, LFTs were within normal range. Patient verifies taking medication as prescribed, with no missed doses.  Patient denies any side effects from the medication and these were reviewed as listed below along with other teaching points.  Patient verifies understanding importance of completing regimen.     POTENTIAL SIDE EFFECTS:   Poor appetite (INH/RIF)  Nausea/vomiting (INH/RIF)  RUQ abdominal tenderness (INH/RIF)  Tea/coffee colored urine (INH/RIF)  Unusual fatigue (INH/RIF)  Rash/itching (INH/RIF)  Yellow skin/eyes (INH/RIF)  Numbness/tingling in arms/legs (INH)  Fever for 3 days (INH/RIF)    TEACHING:  Notify MD/nurse if side effects  Review signs/symptoms of active TB disease  Avoid alcohol use.  Take meds without food (better absorption)  Effect on hormonal contraceptives (RIF)  Orange urine/tears are normal (RIF)  Importance of notifying providers if moving  Importance to complete full regimen    Return in about 1 month (around 12/19/2021).    Karin Ge, PharmD, BCACP  Medication Management (MTM) Pharmacist  [facility]     Current Outpatient Medications   Medication     betamethasone valerate (VALISONE) 0.1 % external cream     isoniazid (NYDRAZID) 300 MG tablet     ketoconazole (NIZORAL) 2 % external cream     rifampin (RIFADIN) 150 MG capsule     No current facility-administered medications for this visit.

## 2021-12-26 NOTE — PROGRESS NOTES
Clinical Pharmacy Consult:                                                    Kaitlin Dejesus is a 56 year old male coming in for a clinical pharmacist consult.  He was referred to me from Dr. Bo.     Reason for Consult: Latent TB medication monitoring    Patient presents for medication monitoring of treatment of latent tuberculosis. He was prescribed three month regimen of daily dosing of isoniazid with rifampin (3HR). He has now completed treatment. Patient verifies taking medication as prescribed, with no missed doses. LFTs were within normal range on treatment with no adverse effects.       Karin Ge, PharmD, BCACP  Medication Management (MTM) Pharmacist  [facility]

## 2021-12-28 ENCOUNTER — OFFICE VISIT (OUTPATIENT)
Dept: PHARMACY | Facility: CLINIC | Age: 56
End: 2021-12-28
Payer: COMMERCIAL

## 2021-12-28 DIAGNOSIS — Z22.7 LATENT TUBERCULOSIS: Primary | ICD-10-CM

## 2021-12-28 PROCEDURE — 99207 PR NO CHARGE LOS: CPT | Performed by: PHARMACIST

## 2022-01-11 ENCOUNTER — OFFICE VISIT (OUTPATIENT)
Dept: FAMILY MEDICINE | Facility: CLINIC | Age: 57
End: 2022-01-11
Payer: COMMERCIAL

## 2022-01-11 VITALS
DIASTOLIC BLOOD PRESSURE: 62 MMHG | BODY MASS INDEX: 27.37 KG/M2 | HEIGHT: 68 IN | SYSTOLIC BLOOD PRESSURE: 124 MMHG | WEIGHT: 180.6 LBS | HEART RATE: 60 BPM

## 2022-01-11 DIAGNOSIS — Z00.00 ROUTINE MEDICAL EXAM: Primary | ICD-10-CM

## 2022-01-11 PROCEDURE — 99396 PREV VISIT EST AGE 40-64: CPT | Performed by: FAMILY MEDICINE

## 2022-01-11 ASSESSMENT — MIFFLIN-ST. JEOR: SCORE: 1613.7

## 2022-01-11 NOTE — PROGRESS NOTES
"  ASSESSMENT/PLAN:   (Z00.00) Routine medical exam  (primary encounter diagnosis)  Comment: Generally the patient is doing very well.  We discussed healthy lifestyles, including adequate exercise (3-4 times a week for 20-30 minutes), and a healthy diet.  Patient should return for annual physicals, and we can also see them here as needed.     Plan: He is not fasting today so we will set up some blood work to be done when he is able to come in for a lab visit.    Patient has been advised of split billing requirements and indicates understanding: Yes  COUNSELING:   Reviewed preventive health counseling, as reflected in patient instructions       Regular exercise       Healthy diet/nutrition    Estimated body mass index is 30.98 kg/m  as calculated from the following:    Height as of 10/22/21: 1.626 m (5' 4\").    Weight as of 10/22/21: 81.9 kg (180 lb 8 oz).         He reports that he has never smoked. He has never used smokeless tobacco.        SUBJECTIVE:   CC: Kaitlin Dejesus is an 56 year old male who presents for preventative health visit.       Patient has been advised of split billing requirements and indicates understanding: Yes  Healthy Habits:     Getting at least 3 servings of Calcium per day:  Yes    Bi-annual eye exam:  NO    Dental care twice a year:  Yes    Sleep apnea or symptoms of sleep apnea:  None    Diet:  Regular (no restrictions)    Frequency of exercise:  2-3 days/week    Duration of exercise:  15-30 minutes    Taking medications regularly:  Yes    Medication side effects:  None    PHQ-2 Total Score: 0    Additional concerns today:  Yes              Today's PHQ-2 Score:   PHQ-2 ( 1999 [employer]) 1/11/2022   Q1: Little interest or pleasure in doing things 0   Q2: Feeling down, depressed or hopeless 0   PHQ-2 Score 0   Q1: Little interest or pleasure in doing things Not at all   Q2: Feeling down, depressed or hopeless Not at all   PHQ-2 Score 0       Abuse: Current or Past(Physical, Sexual or " Emotional)- No  Do you feel safe in your environment? Yes    Have you ever done Advance Care Planning? (For example, a Health Directive, POLST, or a discussion with a medical provider or your loved ones about your wishes): No, advance care planning information given to patient to review.  Patient declined advance care planning discussion at this time.    Social History     Tobacco Use     Smoking status: Never Smoker     Smokeless tobacco: Never Used   Substance Use Topics     Alcohol use: No     If you drink alcohol do you typically have >3 drinks per day or >7 drinks per week? Not applicable    Alcohol Use 1/11/2022   Prescreen: >3 drinks/day or >7 drinks/week? No   Prescreen: >3 drinks/day or >7 drinks/week? -   No flowsheet data found.    Last PSA: No results found for: PSA    Reviewed orders with patient. Reviewed health maintenance and updated orders accordingly - Yes  Labs reviewed in EPIC  BP Readings from Last 3 Encounters:   01/11/22 124/62   10/22/21 120/80   10/22/21 120/80    Wt Readings from Last 3 Encounters:   01/11/22 81.9 kg (180 lb 9.6 oz)   10/22/21 81.9 kg (180 lb 8 oz)   10/22/21 81.9 kg (180 lb 8 oz)                  Patient Active Problem List   Diagnosis     Latent tuberculosis     History reviewed. No pertinent surgical history.    Social History     Tobacco Use     Smoking status: Never Smoker     Smokeless tobacco: Never Used   Substance Use Topics     Alcohol use: No     Family History   Problem Relation Age of Onset     Diabetes No family hx of      Heart Disease No family hx of      Cancer No family hx of          No current outpatient medications on file.     No Known Allergies    Reviewed and updated as needed this visit by clinical staff  Tobacco  Allergies  Meds   Med Hx  Surg Hx  Fam Hx  Soc Hx       Reviewed and updated as needed this visit by Provider  Tobacco  Allergies  Meds   Med Hx  Surg Hx  Fam Hx  Soc Hx      History reviewed. No pertinent past medical history.  "  History reviewed. No pertinent surgical history.    Review of Systems  CONSTITUTIONAL: NEGATIVE for fever, chills, change in weight  INTEGUMENTARY/SKIN: NEGATIVE for worrisome rashes, moles or lesions  EYES: NEGATIVE for vision changes or irritation  ENT: NEGATIVE for ear, mouth and throat problems  RESP: NEGATIVE for significant cough or SOB  CV: NEGATIVE for chest pain, palpitations or peripheral edema  GI: NEGATIVE for nausea, abdominal pain, heartburn, or change in bowel habits   male: negative for dysuria, hematuria, decreased urinary stream, erectile dysfunction, urethral discharge  MUSCULOSKELETAL: NEGATIVE for significant arthralgias or myalgia  NEURO: NEGATIVE for weakness, dizziness or paresthesias  PSYCHIATRIC: NEGATIVE for changes in mood or affect    OBJECTIVE:   /62 (BP Location: Left arm, Patient Position: Sitting, Cuff Size: Adult Large)   Pulse 60   Ht 1.727 m (5' 8\")   Wt 81.9 kg (180 lb 9.6 oz)   BMI 27.46 kg/m      Physical Exam  GENERAL: healthy, alert and no distress  EYES: Eyes grossly normal to inspection, PERRL and conjunctivae and sclerae normal  HENT: ear canals and TM's normal, nose and mouth without ulcers or lesions  NECK: no adenopathy, no asymmetry, masses, or scars and thyroid normal to palpation  RESP: lungs clear to auscultation - no rales, rhonchi or wheezes  CV: regular rate and rhythm, normal S1 S2, no S3 or S4, no murmur, click or rub, no peripheral edema and peripheral pulses strong  ABDOMEN: soft, nontender, no hepatosplenomegaly, no masses and bowel sounds normal  MS: no gross musculoskeletal defects noted, no edema  SKIN: no suspicious lesions or rashes  NEURO: Normal strength and tone, mentation intact and speech normal  PSYCH: mentation appears normal, affect normal/bright    Diagnostic Test Results:  Labs reviewed in Epic  No results found for any visits on 01/11/22.  Trav Koo MD  [facility]  "

## 2022-01-12 DIAGNOSIS — Z13.1 SCREENING FOR DIABETES MELLITUS: Primary | ICD-10-CM

## 2022-01-12 DIAGNOSIS — Z13.220 SCREENING, LIPID: ICD-10-CM

## 2022-01-12 DIAGNOSIS — Z12.5 SCREENING FOR MALIGNANT NEOPLASM OF PROSTATE: ICD-10-CM

## 2022-01-12 NOTE — PROGRESS NOTES
Patient is on our lab schedule for Monday. Appt notes say fasting. Please review and place orders as needed. Thanks

## 2022-01-17 ENCOUNTER — LAB (OUTPATIENT)
Dept: LAB | Facility: CLINIC | Age: 57
End: 2022-01-17
Payer: COMMERCIAL

## 2022-01-17 DIAGNOSIS — Z13.220 SCREENING, LIPID: ICD-10-CM

## 2022-01-17 DIAGNOSIS — Z12.5 SCREENING FOR MALIGNANT NEOPLASM OF PROSTATE: ICD-10-CM

## 2022-01-17 DIAGNOSIS — Z13.1 SCREENING FOR DIABETES MELLITUS: ICD-10-CM

## 2022-01-17 LAB
ALBUMIN SERPL-MCNC: 3.9 G/DL (ref 3.5–5)
ALP SERPL-CCNC: 96 U/L (ref 45–120)
ALT SERPL W P-5'-P-CCNC: 21 U/L (ref 0–45)
ANION GAP SERPL CALCULATED.3IONS-SCNC: 9 MMOL/L (ref 5–18)
AST SERPL W P-5'-P-CCNC: 21 U/L (ref 0–40)
BILIRUB SERPL-MCNC: 0.8 MG/DL (ref 0–1)
BUN SERPL-MCNC: 8 MG/DL (ref 8–22)
CALCIUM SERPL-MCNC: 9.5 MG/DL (ref 8.5–10.5)
CHLORIDE BLD-SCNC: 106 MMOL/L (ref 98–107)
CHOLEST SERPL-MCNC: 172 MG/DL
CO2 SERPL-SCNC: 25 MMOL/L (ref 22–31)
CREAT SERPL-MCNC: 0.69 MG/DL (ref 0.7–1.3)
FASTING STATUS PATIENT QL REPORTED: YES
GFR SERPL CREATININE-BSD FRML MDRD: >90 ML/MIN/1.73M2
GLUCOSE BLD-MCNC: 85 MG/DL (ref 70–125)
HDLC SERPL-MCNC: 29 MG/DL
LDLC SERPL CALC-MCNC: 126 MG/DL
POTASSIUM BLD-SCNC: 4.4 MMOL/L (ref 3.5–5)
PROT SERPL-MCNC: 7.3 G/DL (ref 6–8)
PSA SERPL-MCNC: 4.96 UG/L (ref 0–3.5)
SODIUM SERPL-SCNC: 140 MMOL/L (ref 136–145)
TRIGL SERPL-MCNC: 87 MG/DL

## 2022-01-17 PROCEDURE — G0103 PSA SCREENING: HCPCS

## 2022-01-17 PROCEDURE — 36415 COLL VENOUS BLD VENIPUNCTURE: CPT

## 2022-01-17 PROCEDURE — 80053 COMPREHEN METABOLIC PANEL: CPT

## 2022-01-17 PROCEDURE — 80061 LIPID PANEL: CPT

## 2023-04-18 ENCOUNTER — NURSE TRIAGE (OUTPATIENT)
Dept: NURSING | Facility: CLINIC | Age: 58
End: 2023-04-18
Payer: COMMERCIAL

## 2023-04-18 NOTE — TELEPHONE ENCOUNTER
He states he is having issues going to the bathroom. He feels the urge to go but is unable to. The last time he was able to go was this morning but it was only a small amount. No fever. Having low abd pain. They unfortunately are [place] so unable to triage the pt. Recommended they call their clinic during office hours or to see if there is a nurse care line that can help them in the state they are in.       Reason for Disposition    [1] Caller requesting NON-URGENT health information AND [2] PCP's office is the best resource    Protocols used: INFORMATION ONLY CALL - NO TRIAGE-A-    Qing Paredes RN on 4/18/2023 at 5:39 AM

## 2024-05-31 ENCOUNTER — NURSE TRIAGE (OUTPATIENT)
Dept: NURSING | Facility: CLINIC | Age: 59
End: 2024-05-31
Payer: COMMERCIAL

## 2024-05-31 NOTE — TELEPHONE ENCOUNTER
Daughter calling to report patient had a seizure today.  She called the patient who is with family member for verbal consent to communicate and triage.      Seizure lasted just under five minutes.  Caller reports he is on medication but gets a seizure about once a month.  Patient is currently alert and back to normal.    Disposition is to home care with recommendations to ask PCP about drug levels of medication he is on and caller verbalizes understanding of care advice and agrees with plan.    Effie Villafana RN  [facility]    Reason for Disposition   [1] Seizure lasting < 5 minutes AND [2] history of prior seizure(s) AND [3] taking anticonvulsants    Additional Information   Negative: First seizure ever   Negative: [1] Seizure AND [2] lasts > 5 minutes   Negative: [1] Two or more seizures AND [2] stays confused between seizures   Negative: Bluish (or gray) lips or face now   Negative: Head injury caused the seizure   Negative: Pregnant   Negative: Postpartum (from 0 to 6 weeks after delivery)   Negative: Known poisoning or overdose   Negative: Seizure in a swimming pool   Negative: Diabetes mellitus  (Exception: Now alert, acting normal, and has normal blood glucose [e.g., > 70 mg/dL or 3.9 mmol/L].)   Negative: [1] Unresponsive (can't be awakened) after the seizure stops AND [2] persists > 5 minutes   Negative: [1] Acting confused (e.g., disoriented, slurred speech) after the seizure stops AND [2] persists > 30 minutes   Negative: Sounds like a life-threatening emergency to the triager   Negative: Severe headache  (Note: Most people have a headache after a seizure.)   Negative: Second seizure occurs on the same day   Negative: Substance use (drug use) or unhealthy alcohol use, known or suspected   Negative: Fever > 100.4 F (38.0 C)   Negative: [1] Wants to sleep after the seizure AND [2] persists much longer than usual   Negative: Patient sounds very sick or weak to the triager   Negative: Epileptic  seizures occur frequently (several per week)   Negative: Not on or ran out of seizure medicine (anticonvulsant)   Negative: [1] Seizure of unknown duration AND [2] history of prior seizure(s) AND [3] back to baseline with no new concerning symptoms   Negative: Stopped taking seizure medicine (anticonvulsant)    Protocols used: [id number]

## 2025-01-27 ENCOUNTER — APPOINTMENT (OUTPATIENT)
Dept: GENERAL RADIOLOGY | Facility: CLINIC | Age: 60
End: 2025-01-27
Attending: EMERGENCY MEDICINE
Payer: COMMERCIAL

## 2025-01-27 ENCOUNTER — APPOINTMENT (OUTPATIENT)
Dept: CT IMAGING | Facility: CLINIC | Age: 60
End: 2025-01-27
Attending: EMERGENCY MEDICINE
Payer: COMMERCIAL

## 2025-01-27 ENCOUNTER — HOSPITAL ENCOUNTER (OUTPATIENT)
Facility: CLINIC | Age: 60
Setting detail: OBSERVATION
Discharge: HOME | End: 2025-01-28
Attending: EMERGENCY MEDICINE | Admitting: STUDENT IN AN ORGANIZED HEALTH CARE EDUCATION/TRAINING PROGRAM
Payer: COMMERCIAL

## 2025-01-27 DIAGNOSIS — G40.901 CONVULSIVE SEIZURE DISORDER WITH STATUS EPILEPTICUS (H): ICD-10-CM

## 2025-01-27 LAB
ALBUMIN SERPL BCG-MCNC: 4.4 G/DL (ref 3.5–5.2)
ALBUMIN UR-MCNC: NEGATIVE MG/DL
ALP SERPL-CCNC: 102 U/L (ref 40–150)
ALT SERPL W P-5'-P-CCNC: 23 U/L (ref 0–70)
AMPHETAMINES UR QL SCN: ABNORMAL
ANION GAP SERPL CALCULATED.3IONS-SCNC: 23 MMOL/L (ref 7–15)
APPEARANCE UR: CLEAR
AST SERPL W P-5'-P-CCNC: 37 U/L (ref 0–45)
ATRIAL RATE - MUSE: 66 BPM
BARBITURATES UR QL SCN: ABNORMAL
BASOPHILS # BLD AUTO: 0 10E3/UL (ref 0–0.2)
BASOPHILS NFR BLD AUTO: 0 %
BENZODIAZ UR QL SCN: ABNORMAL
BILIRUB SERPL-MCNC: 0.8 MG/DL
BILIRUB UR QL STRIP: NEGATIVE
BUN SERPL-MCNC: 15.3 MG/DL (ref 8–23)
BZE UR QL SCN: ABNORMAL
CALCIUM SERPL-MCNC: 9.4 MG/DL (ref 8.8–10.4)
CANNABINOIDS UR QL SCN: ABNORMAL
CHLORIDE SERPL-SCNC: 108 MMOL/L (ref 98–107)
COLOR UR AUTO: ABNORMAL
CREAT SERPL-MCNC: 0.8 MG/DL (ref 0.67–1.17)
DIASTOLIC BLOOD PRESSURE - MUSE: NORMAL MMHG
EGFRCR SERPLBLD CKD-EPI 2021: >90 ML/MIN/1.73M2
EOSINOPHIL # BLD AUTO: 0.2 10E3/UL (ref 0–0.7)
EOSINOPHIL NFR BLD AUTO: 2 %
ERYTHROCYTE [DISTWIDTH] IN BLOOD BY AUTOMATED COUNT: 13.4 % (ref 10–15)
FENTANYL UR QL: ABNORMAL
FLUAV RNA SPEC QL NAA+PROBE: NEGATIVE
FLUBV RNA RESP QL NAA+PROBE: NEGATIVE
GLUCOSE BLDC GLUCOMTR-MCNC: 86 MG/DL (ref 70–99)
GLUCOSE SERPL-MCNC: 127 MG/DL (ref 70–99)
GLUCOSE UR STRIP-MCNC: NEGATIVE MG/DL
HCO3 SERPL-SCNC: 16 MMOL/L (ref 22–29)
HCT VFR BLD AUTO: 48.2 % (ref 40–53)
HGB BLD-MCNC: 16.2 G/DL (ref 13.3–17.7)
HGB UR QL STRIP: NEGATIVE
HOLD SPECIMEN: NORMAL
IMM GRANULOCYTES # BLD: 0 10E3/UL
IMM GRANULOCYTES NFR BLD: 0 %
INTERPRETATION ECG - MUSE: NORMAL
KETONES UR STRIP-MCNC: NEGATIVE MG/DL
LEUKOCYTE ESTERASE UR QL STRIP: NEGATIVE
LEVETIRACETAM SERPL-MCNC: <2 ÂΜG/ML (ref 10–40)
LYMPHOCYTES # BLD AUTO: 3.3 10E3/UL (ref 0.8–5.3)
LYMPHOCYTES NFR BLD AUTO: 30 %
MCH RBC QN AUTO: 30.5 PG (ref 26.5–33)
MCHC RBC AUTO-ENTMCNC: 33.6 G/DL (ref 31.5–36.5)
MCV RBC AUTO: 91 FL (ref 78–100)
MONOCYTES # BLD AUTO: 0.9 10E3/UL (ref 0–1.3)
MONOCYTES NFR BLD AUTO: 8 %
MUCOUS THREADS #/AREA URNS LPF: PRESENT /LPF
NEUTROPHILS # BLD AUTO: 6.4 10E3/UL (ref 1.6–8.3)
NEUTROPHILS NFR BLD AUTO: 59 %
NITRATE UR QL: NEGATIVE
NRBC # BLD AUTO: 0 10E3/UL
NRBC BLD AUTO-RTO: 0 /100
OPIATES UR QL SCN: ABNORMAL
P AXIS - MUSE: 44 DEGREES
PCP QUAL URINE (ROCHE): ABNORMAL
PH UR STRIP: 6.5 [PH] (ref 5–7)
PLATELET # BLD AUTO: 277 10E3/UL (ref 150–450)
POTASSIUM SERPL-SCNC: 3.6 MMOL/L (ref 3.4–5.3)
PR INTERVAL - MUSE: 172 MS
PROT SERPL-MCNC: 7.7 G/DL (ref 6.4–8.3)
QRS DURATION - MUSE: 86 MS
QT - MUSE: 406 MS
QTC - MUSE: 425 MS
R AXIS - MUSE: -17 DEGREES
RBC # BLD AUTO: 5.32 10E6/UL (ref 4.4–5.9)
RBC URINE: 0 /HPF
RSV RNA SPEC NAA+PROBE: NEGATIVE
SARS-COV-2 RNA RESP QL NAA+PROBE: NEGATIVE
SODIUM SERPL-SCNC: 147 MMOL/L (ref 135–145)
SP GR UR STRIP: 1.02 (ref 1–1.03)
SQUAMOUS EPITHELIAL: <1 /HPF
SYSTOLIC BLOOD PRESSURE - MUSE: NORMAL MMHG
T AXIS - MUSE: 10 DEGREES
TSH SERPL DL<=0.005 MIU/L-ACNC: 2.47 UIU/ML (ref 0.3–4.2)
UROBILINOGEN UR STRIP-MCNC: NORMAL MG/DL
VENTRICULAR RATE- MUSE: 66 BPM
WBC # BLD AUTO: 10.9 10E3/UL (ref 4–11)
WBC URINE: 1 /HPF

## 2025-01-27 PROCEDURE — 96361 HYDRATE IV INFUSION ADD-ON: CPT | Performed by: FAMILY MEDICINE

## 2025-01-27 PROCEDURE — 250N000011 HC RX IP 250 OP 636

## 2025-01-27 PROCEDURE — 70450 CT HEAD/BRAIN W/O DYE: CPT | Mod: 26 | Performed by: RADIOLOGY

## 2025-01-27 PROCEDURE — 96376 TX/PRO/DX INJ SAME DRUG ADON: CPT | Performed by: FAMILY MEDICINE

## 2025-01-27 PROCEDURE — 71045 X-RAY EXAM CHEST 1 VIEW: CPT | Mod: 26 | Performed by: RADIOLOGY

## 2025-01-27 PROCEDURE — 258N000003 HC RX IP 258 OP 636: Performed by: EMERGENCY MEDICINE

## 2025-01-27 PROCEDURE — 84155 ASSAY OF PROTEIN SERUM: CPT | Performed by: EMERGENCY MEDICINE

## 2025-01-27 PROCEDURE — 36415 COLL VENOUS BLD VENIPUNCTURE: CPT | Performed by: EMERGENCY MEDICINE

## 2025-01-27 PROCEDURE — 81003 URINALYSIS AUTO W/O SCOPE: CPT

## 2025-01-27 PROCEDURE — 96361 HYDRATE IV INFUSION ADD-ON: CPT

## 2025-01-27 PROCEDURE — G0378 HOSPITAL OBSERVATION PER HR: HCPCS

## 2025-01-27 PROCEDURE — 85004 AUTOMATED DIFF WBC COUNT: CPT | Performed by: EMERGENCY MEDICINE

## 2025-01-27 PROCEDURE — 99233 SBSQ HOSP IP/OBS HIGH 50: CPT | Performed by: PHYSICIAN ASSISTANT

## 2025-01-27 PROCEDURE — 96372 THER/PROPH/DIAG INJ SC/IM: CPT

## 2025-01-27 PROCEDURE — 84075 ASSAY ALKALINE PHOSPHATASE: CPT | Performed by: EMERGENCY MEDICINE

## 2025-01-27 PROCEDURE — 96376 TX/PRO/DX INJ SAME DRUG ADON: CPT

## 2025-01-27 PROCEDURE — 82310 ASSAY OF CALCIUM: CPT | Performed by: EMERGENCY MEDICINE

## 2025-01-27 PROCEDURE — 80177 DRUG SCRN QUAN LEVETIRACETAM: CPT | Performed by: EMERGENCY MEDICINE

## 2025-01-27 PROCEDURE — 96375 TX/PRO/DX INJ NEW DRUG ADDON: CPT | Performed by: FAMILY MEDICINE

## 2025-01-27 PROCEDURE — 99291 CRITICAL CARE FIRST HOUR: CPT | Performed by: FAMILY MEDICINE

## 2025-01-27 PROCEDURE — 84443 ASSAY THYROID STIM HORMONE: CPT

## 2025-01-27 PROCEDURE — 250N000011 HC RX IP 250 OP 636: Performed by: EMERGENCY MEDICINE

## 2025-01-27 PROCEDURE — 99291 CRITICAL CARE FIRST HOUR: CPT | Mod: 25 | Performed by: FAMILY MEDICINE

## 2025-01-27 PROCEDURE — 71045 X-RAY EXAM CHEST 1 VIEW: CPT

## 2025-01-27 PROCEDURE — 87637 SARSCOV2&INF A&B&RSV AMP PRB: CPT

## 2025-01-27 PROCEDURE — 80307 DRUG TEST PRSMV CHEM ANLYZR: CPT

## 2025-01-27 PROCEDURE — 99223 1ST HOSP IP/OBS HIGH 75: CPT | Performed by: STUDENT IN AN ORGANIZED HEALTH CARE EDUCATION/TRAINING PROGRAM

## 2025-01-27 PROCEDURE — 70450 CT HEAD/BRAIN W/O DYE: CPT

## 2025-01-27 PROCEDURE — 96375 TX/PRO/DX INJ NEW DRUG ADDON: CPT

## 2025-01-27 PROCEDURE — 96374 THER/PROPH/DIAG INJ IV PUSH: CPT | Performed by: FAMILY MEDICINE

## 2025-01-27 PROCEDURE — 85041 AUTOMATED RBC COUNT: CPT | Performed by: EMERGENCY MEDICINE

## 2025-01-27 PROCEDURE — 93005 ELECTROCARDIOGRAM TRACING: CPT | Performed by: FAMILY MEDICINE

## 2025-01-27 PROCEDURE — 82962 GLUCOSE BLOOD TEST: CPT

## 2025-01-27 RX ORDER — LORAZEPAM 2 MG/ML
2 INJECTION INTRAMUSCULAR
Status: DISCONTINUED | OUTPATIENT
Start: 2025-01-27 | End: 2025-01-27

## 2025-01-27 RX ORDER — SODIUM CHLORIDE 9 MG/ML
INJECTION, SOLUTION INTRAVENOUS CONTINUOUS
Status: ACTIVE | OUTPATIENT
Start: 2025-01-27 | End: 2025-01-28

## 2025-01-27 RX ORDER — LEVETIRACETAM 500 MG/5ML
4500 INJECTION, SOLUTION, CONCENTRATE INTRAVENOUS ONCE
Status: COMPLETED | OUTPATIENT
Start: 2025-01-27 | End: 2025-01-27

## 2025-01-27 RX ORDER — ACETAMINOPHEN 650 MG/1
650 SUPPOSITORY RECTAL EVERY 4 HOURS PRN
Status: DISCONTINUED | OUTPATIENT
Start: 2025-01-27 | End: 2025-01-28 | Stop reason: HOSPADM

## 2025-01-27 RX ORDER — LIDOCAINE 40 MG/G
CREAM TOPICAL
Status: DISCONTINUED | OUTPATIENT
Start: 2025-01-27 | End: 2025-01-27

## 2025-01-27 RX ORDER — POLYETHYLENE GLYCOL 3350 17 G/17G
17 POWDER, FOR SOLUTION ORAL DAILY PRN
Status: DISCONTINUED | OUTPATIENT
Start: 2025-01-27 | End: 2025-01-28 | Stop reason: HOSPADM

## 2025-01-27 RX ORDER — ACETAMINOPHEN 325 MG/1
650 TABLET ORAL EVERY 4 HOURS PRN
Status: DISCONTINUED | OUTPATIENT
Start: 2025-01-27 | End: 2025-01-28 | Stop reason: HOSPADM

## 2025-01-27 RX ORDER — TAMSULOSIN HYDROCHLORIDE 0.4 MG/1
0.4 CAPSULE ORAL DAILY
COMMUNITY
Start: 2024-10-29 | End: 2025-10-29

## 2025-01-27 RX ORDER — FINASTERIDE 5 MG/1
5 TABLET, FILM COATED ORAL DAILY
COMMUNITY
Start: 2024-10-29 | End: 2025-10-29

## 2025-01-27 RX ORDER — ONDANSETRON 2 MG/ML
4 INJECTION INTRAMUSCULAR; INTRAVENOUS EVERY 6 HOURS PRN
Status: DISCONTINUED | OUTPATIENT
Start: 2025-01-27 | End: 2025-01-28 | Stop reason: HOSPADM

## 2025-01-27 RX ORDER — PROCHLORPERAZINE MALEATE 10 MG
10 TABLET ORAL EVERY 6 HOURS PRN
Status: DISCONTINUED | OUTPATIENT
Start: 2025-01-27 | End: 2025-01-28 | Stop reason: HOSPADM

## 2025-01-27 RX ORDER — AMOXICILLIN 250 MG
1 CAPSULE ORAL 2 TIMES DAILY PRN
Status: DISCONTINUED | OUTPATIENT
Start: 2025-01-27 | End: 2025-01-28 | Stop reason: HOSPADM

## 2025-01-27 RX ORDER — OLANZAPINE 10 MG/2ML
5 INJECTION, POWDER, FOR SOLUTION INTRAMUSCULAR EVERY 6 HOURS PRN
Status: DISCONTINUED | OUTPATIENT
Start: 2025-01-27 | End: 2025-01-28

## 2025-01-27 RX ORDER — LORAZEPAM 2 MG/ML
1 INJECTION INTRAMUSCULAR EVERY 5 MIN PRN
Status: COMPLETED | OUTPATIENT
Start: 2025-01-27 | End: 2025-01-27

## 2025-01-27 RX ORDER — LEVETIRACETAM 750 MG/1
750 TABLET ORAL 2 TIMES DAILY
Status: ON HOLD | COMMUNITY
Start: 2023-11-30 | End: 2025-01-28

## 2025-01-27 RX ORDER — OLANZAPINE 10 MG/2ML
5 INJECTION, POWDER, FOR SOLUTION INTRAMUSCULAR ONCE
Status: COMPLETED | OUTPATIENT
Start: 2025-01-27 | End: 2025-01-27

## 2025-01-27 RX ORDER — AMOXICILLIN 250 MG
2 CAPSULE ORAL 2 TIMES DAILY PRN
Status: DISCONTINUED | OUTPATIENT
Start: 2025-01-27 | End: 2025-01-28 | Stop reason: HOSPADM

## 2025-01-27 RX ORDER — LEVETIRACETAM 10 MG/ML
1000 INJECTION INTRAVASCULAR EVERY 12 HOURS
Status: DISCONTINUED | OUTPATIENT
Start: 2025-01-27 | End: 2025-01-28 | Stop reason: HOSPADM

## 2025-01-27 RX ORDER — LIDOCAINE 40 MG/G
CREAM TOPICAL
Status: DISCONTINUED | OUTPATIENT
Start: 2025-01-27 | End: 2025-01-28 | Stop reason: HOSPADM

## 2025-01-27 RX ORDER — CALCIUM CARBONATE 500 MG/1
1000 TABLET, CHEWABLE ORAL 4 TIMES DAILY PRN
Status: DISCONTINUED | OUTPATIENT
Start: 2025-01-27 | End: 2025-01-28 | Stop reason: HOSPADM

## 2025-01-27 RX ORDER — LORAZEPAM 2 MG/ML
1 INJECTION INTRAMUSCULAR ONCE
Status: DISCONTINUED | OUTPATIENT
Start: 2025-01-27 | End: 2025-01-27 | Stop reason: DRUGHIGH

## 2025-01-27 RX ORDER — ONDANSETRON 4 MG/1
4 TABLET, ORALLY DISINTEGRATING ORAL EVERY 6 HOURS PRN
Status: DISCONTINUED | OUTPATIENT
Start: 2025-01-27 | End: 2025-01-28 | Stop reason: HOSPADM

## 2025-01-27 RX ORDER — LORAZEPAM 2 MG/ML
INJECTION INTRAMUSCULAR
Status: COMPLETED
Start: 2025-01-27 | End: 2025-01-27

## 2025-01-27 RX ORDER — HALOPERIDOL 5 MG/ML
2 INJECTION INTRAMUSCULAR
Status: DISCONTINUED | OUTPATIENT
Start: 2025-01-27 | End: 2025-01-27

## 2025-01-27 RX ORDER — LORAZEPAM 2 MG/ML
2 INJECTION INTRAMUSCULAR ONCE
Status: COMPLETED | OUTPATIENT
Start: 2025-01-27 | End: 2025-01-27

## 2025-01-27 RX ADMIN — LORAZEPAM 2 MG: 2 INJECTION INTRAMUSCULAR; INTRAVENOUS at 08:54

## 2025-01-27 RX ADMIN — HALOPERIDOL LACTATE 2 MG: 5 INJECTION, SOLUTION INTRAMUSCULAR at 15:15

## 2025-01-27 RX ADMIN — LEVETIRACETAM 4500 MG: 100 INJECTION, SOLUTION INTRAVENOUS at 09:05

## 2025-01-27 RX ADMIN — LORAZEPAM 2 MG: 2 INJECTION INTRAMUSCULAR at 08:52

## 2025-01-27 RX ADMIN — LORAZEPAM 1 MG: 2 INJECTION INTRAMUSCULAR; INTRAVENOUS at 11:41

## 2025-01-27 RX ADMIN — LORAZEPAM 2 MG: 2 INJECTION INTRAMUSCULAR; INTRAVENOUS at 08:52

## 2025-01-27 RX ADMIN — LORAZEPAM 1 MG: 2 INJECTION INTRAMUSCULAR; INTRAVENOUS at 13:29

## 2025-01-27 RX ADMIN — HALOPERIDOL LACTATE 2 MG: 5 INJECTION, SOLUTION INTRAMUSCULAR at 14:08

## 2025-01-27 RX ADMIN — HALOPERIDOL LACTATE 2 MG: 5 INJECTION, SOLUTION INTRAMUSCULAR at 13:49

## 2025-01-27 RX ADMIN — SODIUM CHLORIDE: 9 INJECTION, SOLUTION INTRAVENOUS at 09:41

## 2025-01-27 RX ADMIN — SODIUM CHLORIDE 500 ML: 9 INJECTION, SOLUTION INTRAVENOUS at 09:08

## 2025-01-27 RX ADMIN — LORAZEPAM 1 MG: 2 INJECTION INTRAMUSCULAR; INTRAVENOUS at 09:58

## 2025-01-27 RX ADMIN — OLANZAPINE 5 MG: 10 INJECTION, POWDER, FOR SOLUTION INTRAMUSCULAR at 18:39

## 2025-01-27 RX ADMIN — LEVETIRACETAM 1000 MG: 10 INJECTION INTRAVENOUS at 21:06

## 2025-01-27 RX ADMIN — LORAZEPAM 1 MG: 2 INJECTION INTRAMUSCULAR; INTRAVENOUS at 13:12

## 2025-01-27 ASSESSMENT — ACTIVITIES OF DAILY LIVING (ADL)
ADLS_ACUITY_SCORE: 41

## 2025-01-27 NOTE — ED PROVIDER NOTES
ED PROVIDER NOTE  [facility]  [facility]      History     Chief Complaint   Patient presents with    Seizures     The history is provided by the patient and medical records.     Kaitlin Dejesus is a 59 year old male with a history of HSV encephalitis (2010) and seizure disorder who presents to the emergency provide after having two seizures, currently in an agitated state.  The patient was noticed by EMS personnel to have another seizure and subsequently had 1 more here in the ER.  During that time the patient was agitated and confused and required restraints with sedation.  The family member states the patient has been taking his Keppra normally and has not had seizures like this out of control but does occasionally have a seizure.  The patient presents here to the ER for evaluation.  No other history has been obtained.    I have reviewed the Medications, Allergies, Past Medical and Surgical History, and Social History in the Epic system.    No past medical history on file.  Seizure disorder on Keppra    No past surgical history on file.      Past medical history, past surgical history, medications, and allergies were reviewed with the patient. Additional pertinent items: None    Family History   Problem Relation Age of Onset    Diabetes No family hx of     Heart Disease No family hx of     Cancer No family hx of        Social History     Tobacco Use    Smoking status: Never    Smokeless tobacco: Never   Substance Use Topics    Alcohol use: No     Social history was reviewed with the patient. Additional pertinent items: None    No Known Allergies    Review of Systems  Unable to complete because of ongoing seizure activity    Physical Exam   BP: 136/75  Pulse: (!) 118  Temp: (!) 96.7  F (35.9  C)  Resp: 20  SpO2: 100 %      Physical Exam  Vitals and nursing note reviewed.   Constitutional:       Comments: Patient brought in by EMS personnel extremely confused and agitated with uncontrolled  motor behavior but initially was not seizing.  Patient subsequently was wrestled and got back into the cart where he started to have a generalized tonic-clonic seizure which would be his fourth seizure in an hour.   HENT:      Head: Atraumatic.   Eyes:      Extraocular Movements: Extraocular movements intact.      Pupils: Pupils are equal, round, and reactive to light.   Neck:      Comments: Airway patent  Pulmonary:      Breath sounds: Normal breath sounds.   Musculoskeletal:         General: No deformity.      Cervical back: Neck supple.   Neurological:      Comments: Initially postictal confused moving all extremities with equal strength and then patient had generalized tonic-clonic seizure activity   Psychiatric:      Comments: Postictal         ED Course     Orders Placed This Encounter   Procedures    CT Head w/o Contrast    XR Chest Port 1 View    Comprehensive metabolic panel    Keppra (Levetiracetam) Level    CBC with platelets and differential    UA with Microscopic reflex to Culture    Extra Tube    Extra Blue Top Tube    Extra Tube    Extra Green Top (Lithium Heparin) Tube    Extra Purple Top Tube    Extra Tube ([place] Draw)    Extra Green Top (Lithium Heparin) Tube    Influenza A/B, RSV and SARS-CoV2 PCR (COVID-19)    Basic metabolic panel    CBC with platelets    TSH with free T4 reflex    EKG 12-lead, complete    Peripheral IV catheter    Neuro checks    Cardiac Continuous Monitoring    Pulse oximetry nursing    Peripheral IV catheter    Assign Team    Vital signs    Peripheral IV catheter    NO Indwelling urinary catheter (Christensen) PRESENT or NEEDED    Voiding Protocol (Trial of Void)    Activity Up ad jeanna    Apply pneumatic compression device (PCD) - Bilateral Calf    Full Code    Oxygen: Nasal cannula, Oxygen mask    [place] to Observation    Seizure precautions    Pneumatic Compression Device (Equipment) - Bilateral Calf    CBC with platelets differential    Urine Drug Screen     Medications    sodium chloride (PF) 0.9% PF flush 3 mL (3 mLs Intracatheter Not Given 1/27/25 0925)   sodium chloride (PF) 0.9% PF flush 3 mL (has no administration in time range)   sodium chloride 0.9 % infusion ( Intravenous Rate/Dose Verify 1/27/25 1202)   levETIRAcetam (KEPPRA) intermittent infusion 1,000 mg (has no administration in time range)   lidocaine 1 % 0.1-1 mL (has no administration in time range)   lidocaine (LMX4) cream (has no administration in time range)   senna-docusate (SENOKOT-S/PERICOLACE) 8.6-50 MG per tablet 1 tablet (has no administration in time range)     Or   senna-docusate (SENOKOT-S/PERICOLACE) 8.6-50 MG per tablet 2 tablet (has no administration in time range)   calcium carbonate (TUMS) chewable tablet 1,000 mg (has no administration in time range)   acetaminophen (TYLENOL) tablet 650 mg (has no administration in time range)     Or   acetaminophen (TYLENOL) Suppository 650 mg (has no administration in time range)   polyethylene glycol (MIRALAX) Packet 17 g (has no administration in time range)   ondansetron (ZOFRAN ODT) ODT tab 4 mg (has no administration in time range)     Or   ondansetron (ZOFRAN) injection 4 mg (has no administration in time range)   prochlorperazine (COMPAZINE) injection 10 mg (has no administration in time range)     Or   prochlorperazine (COMPAZINE) tablet 10 mg (has no administration in time range)   haloperidol lactate (HALDOL) injection 2 mg (has no administration in time range)   LORazepam (ATIVAN) injection 1 mg (1 mg Intravenous $Given 1/27/25 1329)   sodium chloride 0.9% BOLUS 500 mL (0 mLs Intravenous Stopped 1/27/25 1015)   levETIRAcetam (KEPPRA) injection for EMERGENT loading dose 4,500 mg (4,500 mg Intravenous $Given 1/27/25 0905)   LORazepam (ATIVAN) injection 2 mg ( Intravenous Canceled Entry 1/27/25 0949)          Procedures          Results for orders placed or performed during the hospital encounter of 01/27/25 (from the past 24 hours)   CBC with platelets  differential    Narrative    The following orders were created for panel order CBC with platelets differential.  Procedure                               Abnormality         Status                     ---------                               -----------         ------                     CBC with platelets and d...[[id number]]                      Final result                 Please view results for these tests on the individual orders.   Comprehensive metabolic panel   Result Value Ref Range    Sodium 147 (H) 135 - 145 mmol/L    Potassium 3.6 3.4 - 5.3 mmol/L    Carbon Dioxide (CO2) 16 (L) 22 - 29 mmol/L    Anion Gap 23 (H) 7 - 15 mmol/L    Urea Nitrogen 15.3 8.0 - 23.0 mg/dL    Creatinine 0.80 0.67 - 1.17 mg/dL    GFR Estimate >90 >60 mL/min/1.73m2    Calcium 9.4 8.8 - 10.4 mg/dL    Chloride 108 (H) 98 - 107 mmol/L    Glucose 127 (H) 70 - 99 mg/dL    Alkaline Phosphatase 102 40 - 150 U/L    AST 37 0 - 45 U/L    ALT 23 0 - 70 U/L    Protein Total 7.7 6.4 - 8.3 g/dL    Albumin 4.4 3.5 - 5.2 g/dL    Bilirubin Total 0.8 <=1.2 mg/dL   Keppra (Levetiracetam) Level   Result Value Ref Range    Keppra (Levetiracetam) Level <2.0 (L) 10.0 - 40.0  g/mL   CBC with platelets and differential   Result Value Ref Range    WBC Count 10.9 4.0 - 11.0 10e3/uL    RBC Count 5.32 4.40 - 5.90 10e6/uL    Hemoglobin 16.2 13.3 - 17.7 g/dL    Hematocrit 48.2 40.0 - 53.0 %    MCV 91 78 - 100 fL    MCH 30.5 26.5 - 33.0 pg    MCHC 33.6 31.5 - 36.5 g/dL    RDW 13.4 10.0 - 15.0 %    Platelet Count 277 150 - 450 10e3/uL    % Neutrophils 59 %    % Lymphocytes 30 %    % Monocytes 8 %    % Eosinophils 2 %    % Basophils 0 %    % Immature Granulocytes 0 %    NRBCs per 100 WBC 0 <1 /100    Absolute Neutrophils 6.4 1.6 - 8.3 10e3/uL    Absolute Lymphocytes 3.3 0.8 - 5.3 10e3/uL    Absolute Monocytes 0.9 0.0 - 1.3 10e3/uL    Absolute Eosinophils 0.2 0.0 - 0.7 10e3/uL    Absolute Basophils 0.0 0.0 - 0.2 10e3/uL    Absolute Immature Granulocytes 0.0 <=0.4  10e3/uL    Absolute NRBCs 0.0 10e3/uL   Extra Tube    Narrative    The following orders were created for panel order Extra Tube.  Procedure                               Abnormality         Status                     ---------                               -----------         ------                     Extra Blue Top Tube[[id number]]                              Final result                 Please view results for these tests on the individual orders.   Extra Blue Top Tube   Result Value Ref Range    Hold Specimen JIC    Extra Tube    Narrative    The following orders were created for panel order Extra Tube.  Procedure                               Abnormality         Status                     ---------                               -----------         ------                     Extra Red Top Tube[[id number]]                                                          Extra Green Top (Lithium...[[id number]]                      Final result               Extra Purple Top Tube[[id number]]                            Final result                 Please view results for these tests on the individual orders.   Extra Green Top (Lithium Heparin) Tube   Result Value Ref Range    Hold Specimen JIC    Extra Purple Top Tube   Result Value Ref Range    Hold Specimen JIC    XR Chest Port 1 View    Narrative    Portable chest    INDICATION: Acute medical syndrome    COMPARISON: 9/8/2021    FINDINGS: Heart size normal. Patchy densities in the perihilar regions  especially right suprahilar area are slightly more prominent. This may  be due to radiographic positioning given the difference between the  current AP view versus previous PA view. Cannot exclude airway  inflammation. Heart size normal. Atherosclerotic calcification at the  aortic knob      Impression    IMPRESSION: Possible airway inflammatory changes although some of this  could be positional related and technique related. Consider upright PA  and lateral examination for  better detail when clinical condition  permits.    JESU BAIN MD         SYSTEM ID:  [id number]   Extra Tube ([place] Draw)    Narrative    The following orders were created for panel order Extra Tube ([place] Draw).  Procedure                               Abnormality         Status                     ---------                               -----------         ------                     Extra Green Top (Lithium...[[id number]]                      Final result                 Please view results for these tests on the individual orders.   Extra Green Top (Lithium Heparin) Tube   Result Value Ref Range    Hold Specimen [facility]    CT Head w/o Contrast    Narrative    EXAM: CT HEAD W/O CONTRAST  1/27/2025 11:46 AM     HISTORY: AMS       COMPARISON: No similar comparison imaging or report available.  Electronic medical record indicates that this patient underwent brain  MRI in 2010.    TECHNIQUE: Using multidetector thin collimation helical acquisition  technique, axial, coronal and sagittal CT images from the skull base  to the vertex were obtained without intravenous contrast.   (topogram) image(s) also obtained and reviewed.    FINDINGS:  No acute intracranial hemorrhage, mass effect, or midline shift.  Anteromedial left temporal lobe encephalomalacia and ex vacuo  dilatation of the temporal horn of the left lateral ventricle. There  is also right hippocampal volume loss. No acute loss of gray-white  matter differentiation in the cerebral hemispheres. Ventricles are  proportionate to the cerebral sulci. Clear basal cisterns. Scattered  dural calcifications.    The bony calvaria and the bones of the skull base are normal. Mild  paranasal sinus mucosal thickening. Mastoid air cells are clear.  Inferior right mastoid sclerosis. Grossly normal orbits.       Impression    IMPRESSION:  1. No acute intracranial pathology.  2. Left temporal lobe encephalomalacia.    I have personally reviewed the examination and  initial interpretation  and I agree with the findings.    JOSSELINE CARRERA MD         SYSTEM ID:  [id number]              Critical Care Addendum  My initial assessment, based on my review of prehospital provider report, review of nursing observations, review of vital signs, and physical exam, established a high suspicion that Kaitlin Dejesus has severe agitation, altered mental status, and status epilepticus , which requires immediate intervention, and therefore he is critically ill.     After the initial assessment, the care team initiated multiple lab tests, initiated IV fluid administration, initiated medication therapy with high-dose Keppra intravenously along with multiple doses of IV Ativan, and consulted with neurology  to provide stabilization care. Due to the critical nature of this patient, I reassessed physical exam, mental status, and neurologic status multiple times prior to his disposition.     Time also spent performing documentation, discussion with family to obtain medical information for decision making, reviewing test results, discussion with consultants, and coordination of care.     Critical care time (excluding teaching time and procedures): Greater than 60 minutes.         Assessments & Plan (with Medical Decision Making)     I have reviewed the nursing notes.    Patient was initially treated with high-dose Keppra and IV Ativan boluses and maintained a postictal state with adequate sedation but eventually started to wake up requiring now Haldol along with some more Ativan.  The patient's labs were remarkable for no Keppra being on board with probable medical noncompliance as a source of the patient's seizure activity.  The case was discussed with neurology initially and now again and it was decided to bring the patient in on their service to an observation status for further monitoring of his postictal state.    I have reviewed the findings, diagnosis, plan and need for follow up with the  patient.      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

## 2025-01-27 NOTE — H&P
[facility]  Neurology History and Physical    Patient Name:  Kaitlin Dejesus  MRN:  [id number]    :  1965  Date of Service:  2025  Primary care provider:  No Ref-Primary, Physician      Neurology consultation service was asked to see Kaitlin Dejesus by Dr. Crane to evaluate seizures.    Chief Complaint:  seizures    History of Present Illness:   Kaitlin Dejesus is a 59 year old male with a history of HSV encephalitis (), left temporal gliosis, and seizure disorder (on Keppra 750 mg BID), and urinary retention who presents to the emergency provide after having multiple breakthrough seizures.    Patient with history of seizures, onset 2023. Back in , patient presented to ED after  seizure-like activity with shaking and altered mental status, was treated for UTI and additionally started on Keppra 750 mg bid. He follows outpatient neurology through [facility], last seen in 2024. At last outpatient visit, provider recommend increasing Keppra to 1 g bid due to concern for breakthrough seizures but patient declined. Has not had EEG completed.     Today, history comes from daughter and wife. Wife notes that patient typically has a seizure every 2-3 months, last noted episode maybe sometime in October. She describes it as patient waking up in middle of night with loud yell and associated full body shaking and confusion lasting about an hour. Today around 4 AM, wife heard loud yell to which she though it was her son, her  was supposedly getting ready for work. Patient had left for work before wife saw him. While at work, daughter noted coworker found patient on floor, was not confused, unsure of exact history. Patient experienced another episode of shaking while at work to which EMS was called. EMS reported another episode during transport. Additional episode upon arrival to ED where patient had upper extremity  stiffening and foaming of mouth. Daughter notes urinary incontinence. Loaded with Keppra 4.5g and given 2 mg Ativan. CTH with no acute intracranial abnormality, stable left-sided temporal changes. Keppra level undetectable.     Daughter notes patient's baseline is fully functional, works as  full time, can manage his own finances. Only notes, some difficulty with short-term memory which has been present since HSV encephalitis.     ROS  A comprehensive ROS was performed and pertinent findings were included in HPI.     PMH  No past medical history on file.  No past surgical history on file.    Medications   I have personally reviewed the patient's medication list.     Allergies  I have personally reviewed the patient's allergy list.       Physical Examination   Vitals: BP 92/52   Pulse 78   Temp (!) 96.7  F (35.9  C) (Axillary)   Resp 20   SpO2 98%   General: In bed, thrashing around trying to pull at lines  Head: NC/AT  Eyes: no icterus, op pink and moist  Cardiac: Extremities warm, no edema.   Respiratory: non-labored on RA  Skin: No rash or lesion on exposed skin  Neuro:  Neurology exam limited given current mental status.  Mental status: Patient is not alert, not following any commands, is thrashing around in bed trying to pull at lines. Will moan but not speaking any words.   Cranial nerves: PERRL, face appears symmetric.  Motor: Moving all 4 extremities voluntarily and against gravity. No abnormal movements.  Reflexes: Deferred.  Sensory: Deferred.  Coordination: Deferred.   Gait: Deferred.    Investigations   I have personally reviewed pertinent labs, tests, and radiological imaging. Discussion of notable findings is included under Impression. Keppra level <2.0. TSH 4.27. UA without infection.     CTH 1/27  IMPRESSION:  1. No acute intracranial pathology.  2. Left temporal lobe encephalomalacia.    MR Brain 8/28/2023  IMPRESSION:   1. There is volume loss and parenchymal gliosis involving the  anterior and anteromedial left temporal lobe left temporal lobe and inferior aspect of the left insula with compensatory left temporal horn dilatation.   2.  Volume loss involving the anteromedial right temporal lobe and amygdala with compensatory dilatation of the right lateral ventricle.   3.  Imaging findings may reflect sequela of a prior infectious/inflammatory or posttraumatic insult.   4.  No acute infarct or acute intracranial hemorrhage.     Was patient transferred from outside hospital?   No    Impression  #Breakthrough seizure likely 2/2 medication non-compliance  #Post-ictal agitation  #Hx of HSV Encephalitis (2010)  #Left temporal encephalomalacia, stable  Mr. Dejesus is a 59 year old male with a history of HSV encephalitis (2010), left temporal gliosis, and seizure disorder (on Keppra 750 mg BID), and urinary retention who presents to the emergency provide after having multiple breakthrough seizures. Likely 4 episodes since 4 AM this morning. Keppra level undetectable, wife feels that he is forgetful in taking medications. Likely breakthrough seizure given medication non-compliance but will rule out infectious etiologies. Difficult to assess neurological examination given agitation and sedation therefore will avoid further sedating medications. Will consider EEG in AM if not back to baseline. Will admit to neurology service for overnight observation given multiple seizures and to ensure appropriate return to baseline.     Plan:  -Will admit to neurology service for overnight observation  -Increase Keppra to 1g bid  -For agitation:   >PRN Zyprexa 5mg   >Avoid Benzodiazepines and Haldol, discontinued PRN orders  -If no return to baseline, will consider EEG in AM  -Flu/Covid/RSV pending  -UDS pending  -Seizures and Driving:  Per [place] regulations individuals are prohibited from operating a motor vehicle within 3 months following any seizure or other episode with sudden unconsciousness or inability to  sit up, and that are required to report any future such seizure to the DMV within 30 days after the event. I also recommend that patients review all other activities, and avoid any activities that might lead to self-injury or injury of others, within 3 months following any seizure with impaired awareness or impaired motor control. Such activities include but are not limited to holding babies or young children at heights from which they might be injured if dropped, bathing infants or young children in situations in which they might drown without continuous interactive care by an adult who is fully capable at all times during the bath, operating power cutting or other tools, handling firearms, exposure to heights from which they might fall, exposure to vessels with hot cooking oil or water, and swimming alone.        Thank you for involving Neurology in the care of Kaitlin Dejesus.  Please do not hesitate to call with questions/concerns (consult pager [phone]).      Patient was seen and discussed with Dr. Cuevas.    Kamala Mccollum PA-C

## 2025-01-27 NOTE — ED TRIAGE NOTES
BIBA for seizures, post-ictal on arrival, altered mentation and agitated. History obtained from daughter at this time who states patient has been having seizures but only at night in his sleep for the past 1.5 years. Had 4 seizures total reported including during EMS transport. There have been no recent medication changes and daughter states patient is compliant in taking seizure meds

## 2025-01-28 VITALS
RESPIRATION RATE: 17 BRPM | SYSTOLIC BLOOD PRESSURE: 93 MMHG | HEART RATE: 62 BPM | TEMPERATURE: 98.1 F | DIASTOLIC BLOOD PRESSURE: 50 MMHG | OXYGEN SATURATION: 100 %

## 2025-01-28 LAB
ANION GAP SERPL CALCULATED.3IONS-SCNC: 9 MMOL/L (ref 7–15)
BUN SERPL-MCNC: 14.6 MG/DL (ref 8–23)
CALCIUM SERPL-MCNC: 9.1 MG/DL (ref 8.8–10.4)
CHLORIDE SERPL-SCNC: 112 MMOL/L (ref 98–107)
CREAT SERPL-MCNC: 0.72 MG/DL (ref 0.67–1.17)
EGFRCR SERPLBLD CKD-EPI 2021: >90 ML/MIN/1.73M2
ERYTHROCYTE [DISTWIDTH] IN BLOOD BY AUTOMATED COUNT: 13.4 % (ref 10–15)
GLUCOSE SERPL-MCNC: 70 MG/DL (ref 70–99)
HCO3 SERPL-SCNC: 23 MMOL/L (ref 22–29)
HCT VFR BLD AUTO: 40.4 % (ref 40–53)
HGB BLD-MCNC: 14.1 G/DL (ref 13.3–17.7)
MCH RBC QN AUTO: 30.3 PG (ref 26.5–33)
MCHC RBC AUTO-ENTMCNC: 34.9 G/DL (ref 31.5–36.5)
MCV RBC AUTO: 87 FL (ref 78–100)
PLATELET # BLD AUTO: 174 10E3/UL (ref 150–450)
POTASSIUM SERPL-SCNC: 3.4 MMOL/L (ref 3.4–5.3)
RBC # BLD AUTO: 4.65 10E6/UL (ref 4.4–5.9)
SODIUM SERPL-SCNC: 144 MMOL/L (ref 135–145)
WBC # BLD AUTO: 7.1 10E3/UL (ref 4–11)

## 2025-01-28 PROCEDURE — 82565 ASSAY OF CREATININE: CPT

## 2025-01-28 PROCEDURE — 85041 AUTOMATED RBC COUNT: CPT

## 2025-01-28 PROCEDURE — G0378 HOSPITAL OBSERVATION PER HR: HCPCS

## 2025-01-28 PROCEDURE — 258N000003 HC RX IP 258 OP 636

## 2025-01-28 PROCEDURE — 80048 BASIC METABOLIC PNL TOTAL CA: CPT

## 2025-01-28 PROCEDURE — 36415 COLL VENOUS BLD VENIPUNCTURE: CPT

## 2025-01-28 PROCEDURE — 85018 HEMOGLOBIN: CPT

## 2025-01-28 PROCEDURE — 250N000011 HC RX IP 250 OP 636

## 2025-01-28 RX ORDER — LEVETIRACETAM 1000 MG/1
1000 TABLET ORAL 2 TIMES DAILY
Qty: 180 TABLET | Refills: 0 | Status: SHIPPED | OUTPATIENT
Start: 2025-01-28 | End: 2025-04-28

## 2025-01-28 RX ADMIN — LEVETIRACETAM 1000 MG: 10 INJECTION INTRAVENOUS at 09:27

## 2025-01-28 RX ADMIN — SODIUM CHLORIDE: 9 INJECTION, SOLUTION INTRAVENOUS at 01:42

## 2025-01-28 ASSESSMENT — ACTIVITIES OF DAILY LIVING (ADL)
ADLS_ACUITY_SCORE: 51
ADLS_ACUITY_SCORE: 41
ADLS_ACUITY_SCORE: 41
ADLS_ACUITY_SCORE: 47
ADLS_ACUITY_SCORE: 47
ADLS_ACUITY_SCORE: 41
ADLS_ACUITY_SCORE: 47
ADLS_ACUITY_SCORE: 47
ADLS_ACUITY_SCORE: 41
ADLS_ACUITY_SCORE: 51
ADLS_ACUITY_SCORE: 41
ADLS_ACUITY_SCORE: 51

## 2025-01-28 NOTE — PLAN OF CARE
Blood pressure 102/67, pulse 98, temperature 97.8  F (36.6  C), temperature source Oral, resp. rate 18, SpO2 96%.  Goal Outcome Evaluation:  A&Ox4 c/o abdominal pain 10/10 IV dilaudid 0.5 mg given which was effective ,reported intermittent nausea,fair appetite,c/o constipation and requested suppository,Md notified.Miralax,senna and suppository given,no BM at this time ,no urinary output so far.LS clear ,BS + abdomen soft tender.Plan for IR paracentesis.

## 2025-01-28 NOTE — PROGRESS NOTES
"PRIMARY DIAGNOSIS: \"SEIZURES\"  OUTPATIENT/OBSERVATION GOALS TO BE MET BEFORE DISCHARGE:  ADLs back to baseline:  Progressing    Activity and level of assistance: Up with standby assistance.    Pain status: Improved-controlled with oral pain medications.    Return to near baseline physical activity: Progressing     Discharge Planner Nurse   Safe discharge environment identified: No  Barriers to discharge: Yes       Entered by: Akila Ornelas RN 01/28/2025 12:58 PM     Please review provider order for any additional goals.   Nurse to notify provider when observation goals have been met and patient is ready for discharge.    "

## 2025-01-28 NOTE — CODE/RAPID RESPONSE
Rapid Response Team Note    Assessment   A rapid response was called on Kaitlin Dejesus due to  encephalopathy and restlessness  .  Patient noted to be requiring 3:1 as he is trying to crawl out of bed and is restless. He is able to stand, though correa not follow commands and is impulsive. He has received IV haldol, ativan, and Zyprexa. Patient admitted to Neurology Service for breakthrough seizure in setting of undetectable keppra level. Sx may be related to sedating medications and post-ictal state.     Plan   - Patient may require restraint for safety  - Neurology team at bedside and will discuss POC moving forward  - The Neurology primary team was at bedside  - Disposition: The patient will remain on the current unit. We will continue to monitor this patient closely.  - Reassessment and plan follow-up will be performed by the primary team    Sherry Solorio PA-C  Rapid Response Team [facility]  Securely message with [employer]     Medical Decision Making       50 MINUTES SPENT BY ME on the date of service doing chart review, history, exam, documentation & further activities per the note.          Hospital Course   Brief Summary of events leading to rapid response:   RRT called for restlessness and encephalopathy     Physical Exam   Vital Signs: Temp: 97.6  F (36.4  C) Temp src: Axillary BP: 108/57 Pulse: 51   Resp: 20 SpO2: 100 % O2 Device: None (Room air) Oxygen Delivery: 1.5 LPM  Weight: 0 lbs 0 oz      Exam:     Physical Exam   Constitutional: somnolent and restless male sitting on edge of bed.    HEENT:   Head: Normocephalic and atraumatic.   Eyes: Conjunctivae are normal. Pupils are equal, round, and reactive to light.  Pharynx has no erythema or exudate, mucous membranes are moist  Neck:   No adenopathy, no bony tenderness  Cardiovascular: Regular rate and rhythm without murmurs or gallops  Pulmonary/Chest: Clear to auscultation bilaterally, with no wheezes or retractions. No respiratory distress.  GI: Soft  with good bowel sounds.  Non-tender, non-distended, with no guarding, no rebound, no peritoneal signs.   Back:  No bony or CVA tenderness   Musculoskeletal:  No edema or clubbing   Skin: Skin is warm and dry. No rash noted.   Neurological: Moves all extremities  Psychiatric:  unable to assess       The patient is not known to have an infection.

## 2025-01-28 NOTE — PLAN OF CARE
"Goal Outcome Evaluation:      Plan of Care Reviewed With: patient    Overall Patient Progress: no changeOverall Patient Progress: no change         PRIMARY DIAGNOSIS: \"SEIZURES\"  OUTPATIENT/OBSERVATION GOALS TO BE MET BEFORE DISCHARGE:  ADLs back to baseline:  Progressing    Activity and level of assistance: Up with standby assistance.    Pain status: Improved-controlled with oral pain medications.    Return to near baseline physical activity:  Progressing     Discharge Planner Nurse   Safe discharge environment identified: No  Barriers to discharge: Yes       Entered by: Akila Ornelas RN 01/28/2025 1:57 PM     Please review provider order for any additional goals.   Nurse to notify provider when observation goals have been met and patient is ready for discharge.    "

## 2025-01-28 NOTE — PLAN OF CARE
Blood pressure 102/67, pulse 98, temperature 97.8  F (36.6  C), temperature source Oral, resp. rate 18, SpO2 96%.  Goal Outcome Evaluation:       Pt.is A&Ox4,neuro intact,denied pain and nausea regular diet, good appetite,LS clear,BS+,family at bed side supportive with cares.Possible discharge home this afternoon.

## 2025-01-28 NOTE — MEDICATION SCRIBE - ADMISSION MEDICATION HISTORY
Medication Scribe Admission Medication History    Admission medication history is complete. The information provided in this note is only as accurate as the sources available at the time of the update.    Information Source(s): Family member via in-person and phone    Pertinent Information: Daughter (Hermelinda) confirmed the medications that he is currently taking as listed below but she does not know the last time he has taken his medications. She denies that he is taking any other medications. Dispense report and outside medication reconciliation list have been reviewed.     Changes made to PTA medication list:  Added:   finasteride (PROSCAR) 5 MG tablet (last dispense 10/23/24)  levETIRAcetam (KEPPRA) 750 MG tablet (last dispense 10/2024)  tamsulosin (FLOMAX) 0.4 MG capsule (last dispense 10/29/24)  Deleted: None  Changed: None    Allergies reviewed with patient and updates made in EHR: yes    Medication History Completed By: Pilo Greene 1/27/2025 7:14 PM    PTA Med List   Medication Sig Last Dose/Taking    finasteride (PROSCAR) 5 MG tablet Take 5 mg by mouth daily. Unknown    levETIRAcetam (KEPPRA) 750 MG tablet Take 750 mg by mouth 2 times daily. Unknown    tamsulosin (FLOMAX) 0.4 MG capsule Take 0.4 mg by mouth daily. Unknown

## 2025-01-28 NOTE — CODE/RAPID RESPONSE
01/27/25 2000   Call Information   Date of Call 01/27/25   Time of Call 2003   Name of person requesting the team Emmy LINARES   Title of person requesting team RN   RRT Arrival time 2004   Time RRT ended 2035   Reason for call   Type of RRT Adult   Primary reason for call Additional help needed   Was patient transferred from the ED, ICU, or PACU within last 24 hours prior to RRT call? Yes   SBAR   Situation Patient restless and unable to be redirected   Background Per provider note: history of HSV encephalitis (2010), left temporal gliosis, and seizure disorder (on Keppra 750 mg BID), and urinary retention who presents to the emergency provide after having multiple breakthrough seizures.   Notable History/Conditions Seizures   Assessment Not redirectable, confusion.   Interventions Meds   Patient Outcome   Patient Outcome Stabilized on unit   RRT Team   Attending/Primary/Covering Physician Neurology   Date Attending Physician notified 01/27/25   Physician(s) Sherry Solorio PA-C   Lead RN Carlos LINARES   RT Joi H   Post RRT Intervention Assessment   Post RRT Assessment Stable/Improved   Date Follow Up Done 01/28/25   Time Follow Up Done 0010   Comments Resting comfortably

## 2025-01-28 NOTE — DISCHARGE SUMMARY
[facility]  General Neurology Discharge Summary    Patient Name:  Kaitlin Dejesus  MRN:  [id number]    :  1965  Date of Service: 2025      Date of Admission:  2025  Date of Discharge::  2025  Admitting Physician:  Adams Cuevas MD  Discharge Physician:  Adams Cuevas  Primary Care Provider:   No Ref-Primary, Physician  Discharge Disposition:   Discharged to home    Admission Diagnoses:  - Seizure    Discharge Diagnoses:    - Seizure    Brief History of Illness:   Kaitlin Dejesus is a 59 year old male with a history of HSV encephalitis (), left temporal gliosis, seizure disorder (on Keppra 750 mg BID), and urinary retention who presented after having multiple breakthrough seizures in the setting of undetectable Keppra levels.     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 In , he presented to ED after seizure-like activity with shaking and altered mental status, was treated for UTI and additionally started on Keppra 750 mg bid. He follows outpatient neurology through [facility], last seen in 2024. At last outpatient visit, provider recommend increasing Keppra to 1 g bid due to concern for breakthrough seizures but patient declined. Has not had EEG completed. His wife reported that he typically has a seizure every 2-3 months, last noted episode maybe sometime in October. She describes it as patient waking up in middle of night with loud yell and associated full body shaking and confusion lasting about an hour.    On , he presented to the ED following multiple seizures. A coworker found patient on floor, was not confused, unsure of exact history. Patient experienced another episode of shaking while at work to which EMS was called. EMS reported another episode during transport. Additional episode upon arrival to ED where patient had upper extremity  stiffening and foaming of mouth, urinary incontinence. Loaded with Keppra 4.5g and given 2 mg Ativan. CTH with no acute intracranial abnormality, stable left-sided temporal changes. Keppra level undetectable. Daughter notes patient's baseline is fully functional, works as  full time, can manage his own finances. Only notes, some difficulty with short-term memory which has been present since HSV encephalitis.       Hospital Course:  Patient was admitted for observation to clear in the setting of a reported total of 4 seizures during presentation. He was initially encephalopathic for a period of time requiring approximately 24 hours to clear appropriately, likely post-ictal and possibly also in part secondary to sedating medications which were given due to behavioral agitation. It is possible that the behavioral agitation was secondary to the postictal state itself. Patient cleared appropriately during observation (and with discontinuation of sedating medications). Keppra dose was increased to 1000mg BID this admission. He reported being unclear about how regularly he takes his medications, importance of medication compliance particularly with anti-seizure medications was discussed and pillbox recommended to patient and family. Referral also placed for outpatient Neurology follow up.     Pertinent Investigations:  BMP, CBC wnl  Respiratory panel negative   UA non-infectious   UDS positive for benzodiazepines (given in ED)     Consultations:    None    Discharge Procedures:     Reason for your hospital stay    You were in the hospital because you had seizures. The level of your anti-seizure medication (Keppra) in your blood was undetectable, which means you likely missed some doses of your anti-seizure medication leading to your seizures. It is very important for you to make sure you are taking your anti-seizure medication every day, twice daily. We discussed the recommendation of a pillbox for keeping track  of your medications, and will prescribe more of your anti-seizure medication for you on discharge.     You have had your morning dose of keppra today (1/28/25) at 9:00am. Please take a second dose around 9:00pm tonight. Continue to take 1 pill (1000mg) in the morning and 1 pill (1000mg) at night.      Seizures and Driving:  Per [place] regulations individuals are prohibited from operating a motor vehicle within 3 months following any seizure or other episode with sudden unconsciousness or inability to sit up, and that are required to report any future such seizure to the DMV within 30 days after the event. I also recommend that patients review all other activities, and avoid any activities that might lead to self-injury or injury of others, within 3 months following any seizure with impaired awareness or impaired motor control. Such activities include but are not limited to holding babies or young children at heights from which they might be injured if dropped, bathing infants or young children in situations in which they might drown without continuous interactive care by an adult who is fully capable at all times during the bath, operating power cutting or other tools, handling firearms, exposure to heights from which they might fall, exposure to vessels with hot cooking oil or water, and swimming alone.     Activity    Your activity upon discharge: activity as tolerated     Adult [facility] Follow-up and recommended labs and tests    Follow up with primary care provider within 7 days for hospital follow- up.  No follow up labs or test are needed.      Appointments on [place] and/or [facility] (with [facility] or [facility] provider or service). Call [phone] if you haven't heard regarding these appointments within 7 days of discharge.     Diet    Follow this diet upon discharge: Current Diet:Orders Placed This Encounter      Advance Diet as Tolerated: Regular Diet Adult       Discharge Medications:  Current  Discharge Medication List        CONTINUE these medications which have CHANGED    Details   levETIRAcetam (KEPPRA) 1000 MG tablet Take 1 tablet (1,000 mg) by mouth 2 times daily.  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      tamsulosin (FLOMAX) 0.4 MG capsule Take 0.4 mg by mouth daily.             Medication changes:  Keppra increased from 750 mg BID to 1000 mg BID        Discharge physical examination:   Vitals:  BP (P) 119/71 (BP Location: Right arm)   Pulse 62   Temp (P) 98.5  F (36.9  C) (Oral)   Resp (P) 18   SpO2 100%     General: Lying in bed, NAD  Head: NC/AT  Eyes: no icterus, op pink and moist  Cardiac: Extremities warm, no edema.   Respiratory: non-labored on RA  GI: S/NT/ND  Skin: No rash or lesion on exposed skin  Psych: Mood pleasant, affect congruent  Neuro:  Mental status: Awake, alert, attentive, oriented to self, time, place, and circumstance. Language is fluent and coherent with intact comprehension of complex commands, naming and repetition.  Cranial nerves: VFF, PERRL, conjugate gaze, EOMI, facial sensation intact, face symmetric, shoulder shrug strong, tongue/uvula midline, no dysarthria.   Motor: Normal bulk and tone. No abnormal movements. 5/5 strength bilaterally in deltoids, biceps, triceps, hand , hip flexors, knee flexion, knee extension, plantarflexion, dorsiflexion.   Reflexes: normo-reflexic and symmetric biceps, brachioradialis, triceps, patellae, and achilles. No clonus, toes down-going.  Sensory: Intact to light touch, pin, vibration  Coordination: FNF and HS without ataxia or dysmetria.   Gait: Normal width, stride length, turn, and arm swing. Station normal.     Discharge follow up and instructions:    1.  Diet:   Regular  2.  Activity:  Activity as tolerated  3.   Restrictions:    Seizures and Driving:  Per [place] regulations individuals are prohibited from operating a motor vehicle within 3 months following any seizure or other episode with sudden unconsciousness or inability to sit up, and that are required to report any future such seizure to the DMV within 30 days after the event. I also recommend that patients review all other activities, and avoid any activities that might lead to self-injury or injury of others, within 3 months following any seizure with impaired awareness or impaired motor control. Such activities include but are not limited to holding babies or young children at heights from which they might be injured if dropped, bathing infants or young children in situations in which they might drown without continuous interactive care by an adult who is fully capable at all times during the bath, operating power cutting or other tools, handling firearms, exposure to heights from which they might fall, exposure to vessels with hot cooking oil or water, and swimming alone.   4.  Follow up:  Follow up with primary care provider for hospital follow up  5.  Outpatient Neurology referral placed       Patient and family counseled on discharge instructions and all questions answered.     Patient seen and discussed with Dr. Faith Medrano MD  Neurology Resident, PGY1

## 2025-01-29 NOTE — PLAN OF CARE
Goal Outcome Evaluation:    DISCHARGE                         1/28/2025  5:39 PM  ----------------------------------------------------------------------------  Discharged to: Home  Via: Automobile  Accompanied by: Family  Discharge Instructions: diet, activity, medications, follow up appointments, when to call the MD, aftercare instructions, and what to watchout for (i.e. s/s of infection, increasing SOB, palpitations, chest pain,)  Prescriptions: To be filled by discharge pharmacy per pt's request; medication list reviewed & sent with pt  Follow Up Appointments: arranged; information given  Belongings: All sent with pt  IV: out  Pt exhibits understanding of above discharge instructions; all questions answered.    Discharge Paperwork: Signed, copied, and sent home with patient.

## 2025-01-30 NOTE — TELEPHONE ENCOUNTER
Action 1/30/25 MV 1.59pm   Action Taken Imaging request faxed to [facility]       RECORDS RECEIVED FROM: internal   REASON FOR VISIT: seizures   PROVIDER: Dr. Thomas   DATE OF APPT: 2/12/25   NOTES (FOR ALL VISITS) STATUS DETAILS   OFFICE NOTE from referring provider Internal SEE INPATIENT NOTES   OFFICE NOTE from other specialist Care Everywhere Dr Brandi Cox @  Neuro:  6/11/24 11/30/23   DISCHARGE SUMMARY from hospital Internal [facility]:  1/27/25-1/28/25   DISCHARGE REPORT from the ER Care Everywhere [facility]:  11/2/23   MEDICATION LIST Internal    IMAGING  (FOR ALL VISITS)     MRI (HEAD, NECK, SPINE) In process [facility]:  MRI Brain 8/28/23   CT (HEAD, NECK, SPINE) In process [facility]:  CT Head 1/27/25    [facility]:  CT Head 11/2/23

## 2025-02-12 ENCOUNTER — PRE VISIT (OUTPATIENT)
Dept: NEUROLOGY | Facility: CLINIC | Age: 60
End: 2025-02-12

## 2025-02-12 ENCOUNTER — OFFICE VISIT (OUTPATIENT)
Dept: NEUROLOGY | Facility: CLINIC | Age: 60
End: 2025-02-12
Payer: COMMERCIAL

## 2025-02-12 VITALS
HEIGHT: 70 IN | WEIGHT: 182.2 LBS | OXYGEN SATURATION: 100 % | SYSTOLIC BLOOD PRESSURE: 142 MMHG | DIASTOLIC BLOOD PRESSURE: 88 MMHG | BODY MASS INDEX: 26.08 KG/M2 | HEART RATE: 68 BPM | RESPIRATION RATE: 16 BRPM

## 2025-02-12 DIAGNOSIS — G40.901 CONVULSIVE SEIZURE DISORDER WITH STATUS EPILEPTICUS (H): ICD-10-CM

## 2025-02-12 DIAGNOSIS — G40.209 PARTIAL SYMPTOMATIC EPILEPSY WITH COMPLEX PARTIAL SEIZURES, NOT INTRACTABLE, WITHOUT STATUS EPILEPTICUS (H): Primary | ICD-10-CM

## 2025-02-12 RX ORDER — LEVETIRACETAM 1000 MG/1
TABLET ORAL
Qty: 270 TABLET | Refills: 1 | Status: SHIPPED | OUTPATIENT
Start: 2025-02-12

## 2025-02-12 ASSESSMENT — PAIN SCALES - GENERAL: PAINLEVEL_OUTOF10: NO PAIN (0)

## 2025-02-12 NOTE — LETTER
"2025       RE: Kaitlin Dejesus  [address]  [place] [address]     Dear Colleague,    Thank you for referring your patient, Kaitlin Dejesus, to the [facility] NEUROLOGY [facility] [place] [facility]. Please see a copy of my visit note below.    [facility] Epilepsy Care History and Physical       Patient:  Kaitlin Dejesus  :  1965   Age:  59 year old   Today's Office Visit:  2025    Referring Provider:    Pam Medrano MD  [address] [place] [id number]    Epilepsy Data:    Patient was accompanied by relative/friend and history was also obtained from wife (via telephone)    Chief complaint  Seizure management and medication adjustment.    History of present illness  - 59-year-old,right handed with a remote history of HSV encephalitis and seizures that started approx 10 years back  - Seizures began following refractory encephalitis approximately 10 years ago.  - Hospitalized for a week due to the brain infection, with a two-month recovery period at home. Denies any neurosurgical intervention (including brain biopsy)  - No recollection of seizures during the initial infection period.  - Seizures occur frequently, with at least six complex partial seizures in the past year.  SEMIOLOGY-  unable to describe any aura (denies sensory/linwood vu) but says he feels some tightness in both arms/legs followed by oral automatism (per wife) and confusion. Sometime they may evolve to \"shaking both arms\" lasting 5 mins and post seizure confusion.   Frequency- 6 in last one year    - Started anti-seizure medication approximately a year ago.  - Experiences memory loss, particularly with names of new acquaintances.    Past medical history  - post HSV encephalitis focal epilepsy X 10 years  - Memory loss    Past surgical history  - per pt (wife) unclear if he had brain biopsy     Social history  - Works in general " cleaning at the [employer]  - Lives with wife and kids  - Has two children    Current medications  - Keppra 1 gram, twice a day      Epilepsy Risk Factors:  Encephalitis  Precipitating factors:   Sleep Deprivation and Stress  No past medical history on file.   No past surgical history on file.  Family History   Problem Relation Age of Onset     Diabetes No family hx of      Heart Disease No family hx of      Cancer No family hx of       Social History     Socioeconomic History     Marital status:    Tobacco Use     Smoking status: Never     Smokeless tobacco: Never   Substance and Sexual Activity     Alcohol use: No     Drug use: No     Sexual activity: Yes     Partners: Female      Employment/School:  Employed full time  Driving:  Currently patient is:  Driving: Patient was made aware of state driving laws. Currently meets criteria to continue to drive.    Previous Evaluations for Epilepsy:   EEG: not available   MRI of Brain: IMPRESSION:  1.  There is volume loss and parenchymal gliosis involving the anterior and anteromedial left temporal lobe left temporal lobe and inferior aspect of the left insula with compensatory left temporal horn dilatation.  2.  Volume loss involving the anteromedial right temporal lobe and amygdala with compensatory dilatation of the right lateral ventricle.  3.  Imaging findings may reflect sequela of a prior infectious/inflammatory or posttraumatic insult.  4.  No acute infarct or acute intracranial hemorrhage.  REPORT- 08/25/23         Review of Systems:  Lethargy / Tiredness:  Yes  Nausea / Vomiting:  No  Double Vision:  No  Sleepiness:  Yes  Depression:  Yes  Slowed Cognitive Function:  Yes  Memory Problems:  Yes    Current Outpatient Medications   Medication Sig Dispense Refill     finasteride (PROSCAR) 5 MG tablet Take 5 mg by mouth daily.       levETIRAcetam (KEPPRA) 1000 MG tablet Take 1 tab PO AM, 2 tabs PO  tablet 1     tamsulosin (FLOMAX) 0.4 MG  "capsule Take 0.4 mg by mouth daily.         Perceived AED Side Effects: No    Medication Notes:   AED Medication Compliance:  compliant all of the time  Using a pill box:  No    Past AEDs:      2/12/2025     6:33 PM   AED - ANTIEPILEPTIC DRUGS   levETIRAcetam Take 1 tab PO AM, 2 tabs PO PM (1000 MG TABS)          Medication marked as long-term         Exam:    BP (!) 142/88 (BP Location: Left arm, Patient Position: Sitting, Cuff Size: Adult Regular)   Pulse 68   Resp 16   Ht 1.765 m (5' 9.5\")   Wt 82.6 kg (182 lb 3.2 oz)   SpO2 100%   BMI 26.52 kg/m       Wt Readings from Last 5 Encounters:   02/12/25 82.6 kg (182 lb 3.2 oz)   01/11/22 81.9 kg (180 lb 9.6 oz)   10/22/21 81.9 kg (180 lb 8 oz)   10/22/21 81.9 kg (180 lb 8 oz)   09/07/21 80.2 kg (176 lb 11.2 oz)       General Appearance: Normal  Gait:  Normal  Attention Span:  Normal  Language:  Normal  Extraocular Movements:  Normal  Coordination:  Normal  Visual Fields:  Normal  Facial Sensation:  Normal  Facial Strength:  Normal  Tongue Strength:  Normal  Limb Strength:  Normal  Limb Tone:  Normal  Limb Sensation:  Normal  General Physical Findings:  Normal    Assessment and Plan:     - INTRACTABLE Multifocal /TEMPORAL LOBE epilepsy with imaging suggesting bitemporal (left >>> right) abnormality   - Complex partial seizures, with at least six episodes in the last year.  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  - Likely under reporting seizure burden  Risk of SUDEP high    PLAN:  - Increase Keppra dosage to 1 gram in the morning and 2 grams at night to manage seizures more effectively. This adjustment aims to minimize side effects by taking a higher dose at night when the patient is sleeping (pt NOT KEEN AND WOULD LIKE TO CONTINUE 1 GM BID. IF SZ RETURNS HE PLANS TO INCREASE THE DOSE)  - Enforce a driving restriction for three months due to the risk of seizures. The patient is " advised not to drive during this period to comply with state law and prevent potential accidents.  - Schedule follow-up appointments every six months to monitor seizure activity and adjust treatment as necessary.  - Provide a prescription for the adjusted Keppra dosage and ensure the patient has an adequate supply. In future consider adding/changing to other AEDS like Trileptal/Tegretol/ LCM ( restriction with insurance/finance)  - Encourage the patient to track seizure occurrences and report them by phone to adjust medication if needed.  - Recommend adjusting work hours to allow for better sleep and access to transportation, supporting overall health and seizure management.  -  Will arrange 3 hrs EEG at present     As described above, I met with the patient for 50 minutes and during this time counseling was greater than 50% of the visit time.          Again, thank you for allowing me to participate in the care of your patient.      Sincerely,    Kim Thomas MD

## 2025-02-12 NOTE — NURSING NOTE
"Chief Complaint   Patient presents with    New Patient     BP (!) 142/88 (BP Location: Left arm, Patient Position: Sitting, Cuff Size: Adult Regular)   Pulse 68   Resp 16   Ht 1.765 m (5' 9.5\")   Wt 82.6 kg (182 lb 3.2 oz)   SpO2 100%   BMI 26.52 kg/m    Jaclyn Tovar    "

## 2025-02-13 NOTE — PROGRESS NOTES
"[facility] Epilepsy Care History and Physical       Patient:  Kaitlin Dejesus  :  1965   Age:  59 year old   Today's Office Visit:  2025    Referring Provider:    Pam Medrano MD  [address] [place] [id number]    Epilepsy Data:    Patient was accompanied by relative/friend and history was also obtained from wife (via telephone)    Chief complaint  Seizure management and medication adjustment.    History of present illness  - 59-year-old,right handed with a remote history of HSV encephalitis and seizures that started approx 10 years back  - Seizures began following refractory encephalitis approximately 10 years ago.  - Hospitalized for a week due to the brain infection, with a two-month recovery period at home. Denies any neurosurgical intervention (including brain biopsy)  - No recollection of seizures during the initial infection period.  - Seizures occur frequently, with at least six complex partial seizures in the past year.  SEMIOLOGY-  unable to describe any aura (denies sensory/linwood vu) but says he feels some tightness in both arms/legs followed by oral automatism (per wife) and confusion. Sometime they may evolve to \"shaking both arms\" lasting 5 mins and post seizure confusion.   Frequency- 6 in last one year    - Started anti-seizure medication approximately a year ago.  - Experiences memory loss, particularly with names of new acquaintances.    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   No past surgical history on file.  Family History "   Problem Relation Age of Onset    Diabetes No family hx of     Heart Disease No family hx of     Cancer No family hx of       Social History     Socioeconomic History    Marital status:    Tobacco Use    Smoking status: Never    Smokeless tobacco: Never   Substance and Sexual Activity    Alcohol use: No    Drug use: No    Sexual activity: Yes     Partners: Female      Employment/School:  Employed full time  Driving:  Currently patient is:  Driving: Patient was made aware of state driving laws. Currently meets criteria to continue to drive.    Previous Evaluations for Epilepsy:   EEG: not available   MRI of Brain: IMPRESSION:  1.  There is volume loss and parenchymal gliosis involving the anterior and anteromedial left temporal lobe left temporal lobe and inferior aspect of the left insula with compensatory left temporal horn dilatation.  2.  Volume loss involving the anteromedial right temporal lobe and amygdala with compensatory dilatation of the right lateral ventricle.  3.  Imaging findings may reflect sequela of a prior infectious/inflammatory or posttraumatic insult.  4.  No acute infarct or acute intracranial hemorrhage.  REPORT- 08/25/23         Review of Systems:  Lethargy / Tiredness:  Yes  Nausea / Vomiting:  No  Double Vision:  No  Sleepiness:  Yes  Depression:  Yes  Slowed Cognitive Function:  Yes  Memory Problems:  Yes    Current Outpatient Medications   Medication Sig Dispense Refill    finasteride (PROSCAR) 5 MG tablet Take 5 mg by mouth daily.      levETIRAcetam (KEPPRA) 1000 MG tablet Take 1 tab PO AM, 2 tabs PO  tablet 1    tamsulosin (FLOMAX) 0.4 MG capsule Take 0.4 mg by mouth daily.         Perceived AED Side Effects: No    Medication Notes:   AED Medication Compliance:  compliant all of the time  Using a pill box:  No    Past AEDs:      2/12/2025     6:33 PM   AED - ANTIEPILEPTIC DRUGS   levETIRAcetam Take 1 tab PO AM, 2 tabs PO PM (1000 MG TABS)          Medication marked as  "long-term         Exam:    BP (!) 142/88 (BP Location: Left arm, Patient Position: Sitting, Cuff Size: Adult Regular)   Pulse 68   Resp 16   Ht 1.765 m (5' 9.5\")   Wt 82.6 kg (182 lb 3.2 oz)   SpO2 100%   BMI 26.52 kg/m       Wt Readings from Last 5 Encounters:   02/12/25 82.6 kg (182 lb 3.2 oz)   01/11/22 81.9 kg (180 lb 9.6 oz)   10/22/21 81.9 kg (180 lb 8 oz)   10/22/21 81.9 kg (180 lb 8 oz)   09/07/21 80.2 kg (176 lb 11.2 oz)       General Appearance: Normal  Gait:  Normal  Attention Span:  Normal  Language:  Normal  Extraocular Movements:  Normal  Coordination:  Normal  Visual Fields:  Normal  Facial Sensation:  Normal  Facial Strength:  Normal  Tongue Strength:  Normal  Limb Strength:  Normal  Limb Tone:  Normal  Limb Sensation:  Normal  General Physical Findings:  Normal    Assessment and Plan:     - NON-INTRACTABLE Multifocal /TEMPORAL LOBE epilepsy with imaging suggesting bitemporal (left >>> right) abnormality   - Complex partial seizures, with at least six episodes in the last year.  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  - Likely under reporting seizure burden  Risk of SUDEP high    PLAN:  - Increase Keppra dosage to 1 gram in the morning and 2 grams at night to manage seizures more effectively. This adjustment aims to minimize side effects by taking a higher dose at night when the patient is sleeping (pt NOT KEEN AND WOULD LIKE TO CONTINUE 1 GM BID. IF SZ RETURNS HE PLANS TO INCREASE THE DOSE)  - Enforce a driving restriction for three months due to the risk of seizures. The patient is advised not to drive during this period to comply with state law and prevent potential accidents.  - Schedule follow-up appointments every six months to monitor seizure activity and adjust treatment as necessary.  - Provide a prescription for the adjusted Keppra dosage and ensure the patient has an adequate supply. In future consider " adding/changing to other AEDS like Trileptal/Tegretol/ LCM ( restriction with insurance/finance)  - Encourage the patient to track seizure occurrences and report them by phone to adjust medication if needed.  - Recommend adjusting work hours to allow for better sleep and access to transportation, supporting overall health and seizure management.  -  Will arrange 3 hrs EEG at present     As described above, I met with the patient for 50 minutes and during this time counseling was greater than 50% of the visit time.    Based on our discussion, I have outlined the following instructions for you:    - Take Keppra: 1 gram in the morning and 2 grams at night.    - Do not drive for the next three months to stay safe and follow the law.    - Make sure to have follow-up appointments every six months to check on your seizures and treatment.    - Get a prescription for the new Keppra dosage and make sure you have enough supply.    - Keep a record of any seizures you have and report them by phone.    - Consider changing your work hours to get better sleep and make it easier to get around.    Next appointment(s): - Follow-up appointment in three months to assess seizure control and driving eligibility.    Thank you again for your visit, and we look forward to supporting you in your journey to better health.

## 2025-03-07 ENCOUNTER — ORDERS ONLY (AUTO-RELEASED) (OUTPATIENT)
Dept: FAMILY MEDICINE | Facility: CLINIC | Age: 60
End: 2025-03-07

## 2025-03-07 DIAGNOSIS — Z12.11 SCREEN FOR COLON CANCER: ICD-10-CM

## 2025-04-07 ENCOUNTER — ANCILLARY PROCEDURE (OUTPATIENT)
Dept: NEUROLOGY | Facility: CLINIC | Age: 60
End: 2025-04-07
Attending: PSYCHIATRY & NEUROLOGY
Payer: COMMERCIAL

## 2025-04-07 DIAGNOSIS — G40.209 PARTIAL SYMPTOMATIC EPILEPSY WITH COMPLEX PARTIAL SEIZURES, NOT INTRACTABLE, WITHOUT STATUS EPILEPTICUS (H): ICD-10-CM

## 2025-04-07 PROCEDURE — 95718 EEG PHYS/QHP 2-12 HR W/VEEG: CPT | Performed by: PSYCHIATRY & NEUROLOGY

## 2025-04-07 PROCEDURE — 95700 EEG CONT REC W/VID EEG TECH: CPT | Performed by: PSYCHIATRY & NEUROLOGY

## 2025-04-07 PROCEDURE — 95713 VEEG 2-12 HR CONT MNTR: CPT | Performed by: PSYCHIATRY & NEUROLOGY

## 2025-05-01 ENCOUNTER — TELEPHONE (OUTPATIENT)
Dept: NEUROLOGY | Facility: CLINIC | Age: 60
End: 2025-05-01

## 2025-05-01 NOTE — TELEPHONE ENCOUNTER
"Spoke to patient. He recently presented to ER on 4/4 for seizure like activity. Per ER notes:   Symptoms/background (EMS narrative): medics report [place] was in [place] [employer] and others in the gym report seizure like activity. Medics report that pt was combative and nonsensical on arrival, assumed to be postictal. Medics used soft restraints to get pt into the ambulance, pt out of restraints on arrival to ED.     Patient gave me an overview that he \"fell asleep\" while waiting for his son to pick him up from the gym. He has no recollection of seizure like activity. He is very concerned that he will not be able to drive now. I did reiterate that he should not be driving now for another 3 months. It is [place] state law and for his safety. He is concerned with cost of transportation and his kids going to be gone in the summer with how he will get around. I did let him know we can assist with any FMLA/ADA forms if needed. He will check with his work if this is needed.     EEG completed on 4/7.     He has a follow-up with Dr. Thomas on 6/4. Reports that he is taking seizure meds as prescribed and no further episodes reported since ER visit.   "

## 2025-05-01 NOTE — TELEPHONE ENCOUNTER
Kaitlin called in hoping for a call back from a nurse as he has some questions about his driving rights.

## 2025-05-02 NOTE — TELEPHONE ENCOUNTER
Ok now its time to consider admission to Epilepsy monitoring unit for video EEG and quantify and localize seizures. His MRI brain showed changes suggestive of temporal lobe epilepsy and he is already taking anti seizure medication and we are unable to control his seizures. I have placed the order.

## 2025-05-05 ENCOUNTER — OFFICE VISIT (OUTPATIENT)
Dept: NEUROLOGY | Facility: CLINIC | Age: 60
End: 2025-05-05
Payer: COMMERCIAL

## 2025-05-05 VITALS
WEIGHT: 184.4 LBS | HEART RATE: 74 BPM | SYSTOLIC BLOOD PRESSURE: 117 MMHG | TEMPERATURE: 98.1 F | OXYGEN SATURATION: 98 % | RESPIRATION RATE: 19 BRPM | DIASTOLIC BLOOD PRESSURE: 72 MMHG | BODY MASS INDEX: 26.84 KG/M2

## 2025-05-05 DIAGNOSIS — G40.901 CONVULSIVE SEIZURE DISORDER WITH STATUS EPILEPTICUS (H): ICD-10-CM

## 2025-05-05 RX ORDER — LEVETIRACETAM 1000 MG/1
1000 TABLET ORAL 2 TIMES DAILY
Qty: 180 TABLET | Refills: 1 | Status: SHIPPED | OUTPATIENT
Start: 2025-05-05 | End: 2025-08-03

## 2025-05-05 ASSESSMENT — PAIN SCALES - GENERAL: PAINLEVEL_OUTOF10: NO PAIN (0)

## 2025-05-05 NOTE — LETTER
"2025       RE: Kaitlin Dejesus  : 1965   MRN: [id number]      Dear Colleague,    Thank you for referring your patient, Kaitlin Dejesus, to the [facility] EPILEPSY CARE at [facility]. Please see a copy of my visit note below.    [facility] [place]/[facility] Epilepsy Care Progress Note       Patient:  Kaitlin Dejesus  :  1965   Age:  59 year old   Today's Office Visit:  2025    Epilepsy Data:    Patient was accompanied by relative/friend and history was also obtained from wife (via telephone)    Chief complaint  Seizure management and medication adjustment.    Interval History   Since his most recent visit, the patient states that he has not had a seizure.  However, there was an event at the beginning of April in which the patient was at the gym and \"fell asleep\" in the lobby.  He was seen to have some type of an episode that was interpreted by bystanders to be a seizure, and he was transported to the emergency department.  His son was present for this visit and thinks that the aforementioned event was indeed a seizure.    We discussed the legal imperative to continue not driving until at least 3 months from the event associated with ED visit on 2025.    Epilepsy History   - 59-year-old,right handed with a remote history of HSV encephalitis and seizures that started approx 10 years back  - Seizures began following refractory encephalitis approximately 10 years ago.  - Hospitalized for a week due to the brain infection, with a two-month recovery period at home. Denies any neurosurgical intervention (including brain biopsy)  - No recollection of seizures during the initial infection period.  - Seizures occur frequently, with at least six complex partial seizures in the past year.  SEMIOLOGY-  unable to describe any aura (denies sensory/linwood vu) but says he feels some tightness in both arms/legs followed by oral automatism (per " "wife) and confusion. Sometime they may evolve to \"shaking both arms\" lasting 5 mins and post seizure confusion.   Frequency- 6 in last one year    - Started anti-seizure medication approximately a year ago.  - Experiences memory loss, particularly with names of new acquaintances.    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   No past surgical history on file.  Family History   Problem Relation Age of Onset     Diabetes No family hx of      Heart Disease No family hx of      Cancer No family hx of       Social History     Socioeconomic History     Marital status:    Tobacco Use     Smoking status: Never     Smokeless tobacco: Never   Substance and Sexual Activity     Alcohol use: No     Drug use: No     Sexual activity: Yes     Partners: Female      Employment/School:  Employed full time  Driving:   Patient was made aware of state driving laws. Currently does not meet criteria to continue to drive and patient was informed of this.    Previous Evaluations for Epilepsy:   EEG: not available   MRI of Brain: IMPRESSION:  1.  There is volume loss and parenchymal gliosis involving the anterior and anteromedial left temporal lobe left temporal lobe and inferior aspect of the left insula with compensatory left temporal horn dilatation.  2.  Volume loss involving the anteromedial right temporal lobe and amygdala with compensatory dilatation of the right lateral ventricle.  3.  Imaging findings may reflect sequela of a prior infectious/inflammatory or posttraumatic insult.  4.  No acute infarct or " acute intracranial hemorrhage.  REPORT- 08/25/23         Review of Systems:  Lethargy / Tiredness:  Yes  Nausea / Vomiting:  No  Double Vision:  No  Sleepiness:  Yes  Depression:  Yes  Slowed Cognitive Function:  Yes  Memory Problems:  Yes    Current Outpatient Medications   Medication Sig Dispense Refill     finasteride (PROSCAR) 5 MG tablet Take 5 mg by mouth daily.       levETIRAcetam (KEPPRA) 1000 MG tablet Take 1 tablet (1,000 mg) by mouth 2 times daily. Take 1 tab PO AM, 2 tabs PO  tablet 1     tamsulosin (FLOMAX) 0.4 MG capsule Take 0.4 mg by mouth daily.         Perceived AED Side Effects: No    Medication Notes:   AED Medication Compliance:  compliant all of the time  Using a pill box:  No    Past AEDs:      5/5/2025     1:30 PM   AED - ANTIEPILEPTIC DRUGS   levETIRAcetam 1,000 mg BID Take 1 tab PO AM, 2 tabs PO PM PO           Medication marked as long-term         Exam:    /72 (BP Location: Right arm, Patient Position: Sitting, Cuff Size: Adult Large)   Pulse 74   Temp 98.1  F (36.7  C) (Temporal)   Resp 19   Wt 184 lb 6.4 oz (83.6 kg)   SpO2 98%   BMI 26.84 kg/m       Wt Readings from Last 5 Encounters:   05/05/25 184 lb 6.4 oz (83.6 kg)   03/07/25 189 lb (85.7 kg)   02/12/25 182 lb 3.2 oz (82.6 kg)   01/11/22 180 lb 9.6 oz (81.9 kg)   10/22/21 180 lb 8 oz (81.9 kg)       General Appearance: Normal  Gait:  Normal  Attention Span:  Normal  Language:  Normal  Extraocular Movements:  Normal  Coordination:  Normal  Visual Fields:  Normal  Facial Sensation:  Normal  Facial Strength:  Normal  Tongue Strength:  Normal  Limb Strength:  Normal  Limb Tone:  Normal  Limb Sensation:  Normal  General Physical Findings:  Normal    Assessment and Plan:     - NON-INTRACTABLE Multifocal /TEMPORAL LOBE epilepsy with imaging suggesting bitemporal (left >>> right) abnormality   - Complex partial seizures, with at least six episodes in the last year.  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  - Likely under reporting seizure burden  Risk of SUDEP high    PLAN:  - We had previously recommended that the patient increase Keppra dosage to 1 gram in the morning and 2 grams at night to manage seizures more effectively. This proposed adjustment would aim to minimize side effects by taking a higher dose at night when the patient is sleeping (pt continues to be NOT KEEN AND WOULD LIKE TO CONTINUE 1 GM BID)  - Enforce a driving restriction for three months due to the risk of seizures. The patient is advised not to drive during this period to comply with state law and prevent potential accidents.  - Schedule follow-up appointments every six months to monitor seizure activity and adjust treatment as necessary.  - Provide a prescription for the adjusted Keppra dosage and ensure the patient has an adequate supply. In future consider adding/changing to other AEDS like Trileptal/Tegretol/ LCM ( restriction with insurance/finance)  - Encourage the patient to track seizure occurrences and report them by phone to adjust medication if needed.  - Recommend adjusting work hours to allow for better sleep and access to transportation, supporting overall health and seizure management.      Next appointment(s): - Follow-up appointment in six months to assess seizure control.    Thank you again for your visit, and we look forward to supporting you in your journey to better health.    Discussed with attending neurologist Dr. Thomas. I agree with note and have seen the pt.     Signed,    Siddhartha Birmingham MD  Neurology, PGY3      Again, thank you for allowing me to participate in the care of your patient.      Sincerely,    Kim Thomas MD

## 2025-05-05 NOTE — PROGRESS NOTES
"[facility] Epilepsy Care Progress Note       Patient:  Kaitlin Dejesus  :  1965   Age:  59 year old   Today's Office Visit:  2025    Epilepsy Data:    Patient was accompanied by relative/friend and history was also obtained from wife (via telephone)    Chief complaint  Seizure management and medication adjustment.    Interval History   Since his most recent visit, the patient states that he has not had a seizure.  However, there was an event at the beginning of April in which the patient was at the gym and \"fell asleep\" in the lobby.  He was seen to have some type of an episode that was interpreted by bystanders to be a seizure, and he was transported to the emergency department.  His son was present for this visit and thinks that the aforementioned event was indeed a seizure.    We discussed the legal imperative to continue not driving until at least 3 months from the event associated with ED visit on 2025.    Epilepsy History   - 59-year-old,right handed with a remote history of HSV encephalitis and seizures that started approx 10 years back  - Seizures began following refractory encephalitis approximately 10 years ago.  - Hospitalized for a week due to the brain infection, with a two-month recovery period at home. Denies any neurosurgical intervention (including brain biopsy)  - No recollection of seizures during the initial infection period.  - Seizures occur frequently, with at least six complex partial seizures in the past year.  SEMIOLOGY-  unable to describe any aura (denies sensory/linwood vu) but says he feels some tightness in both arms/legs followed by oral automatism (per wife) and confusion. Sometime they may evolve to \"shaking both arms\" lasting 5 mins and post seizure confusion.   Frequency- 6 in last one year    - Started anti-seizure medication approximately a year ago.  - Experiences memory loss, particularly with names of new acquaintances.    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   No past surgical history on file.  Family History   Problem Relation Age of Onset    Diabetes No family hx of     Heart Disease No family hx of     Cancer No family hx of       Social History     Socioeconomic History    Marital status:    Tobacco Use    Smoking status: Never    Smokeless tobacco: Never   Substance and Sexual Activity    Alcohol use: No    Drug use: No    Sexual activity: Yes     Partners: Female      Employment/School:  Employed full time  Driving:   Patient was made aware of state driving laws. Currently does not meet criteria to continue to drive and patient was informed of this.    Previous Evaluations for Epilepsy:   EEG: not available   MRI of Brain: IMPRESSION:  1.  There is volume loss and parenchymal gliosis involving the anterior and anteromedial left temporal lobe left temporal lobe and inferior aspect of the left insula with compensatory left temporal horn dilatation.  2.  Volume loss involving the anteromedial right temporal lobe and amygdala with compensatory dilatation of the right lateral ventricle.  3.  Imaging findings may reflect sequela of a prior infectious/inflammatory or posttraumatic insult.  4.  No acute infarct or acute intracranial hemorrhage.  REPORT- 08/25/23         Review of Systems:  Lethargy / Tiredness:  Yes  Nausea / Vomiting:  No  Double Vision:  No  Sleepiness:  Yes  Depression:  Yes  Slowed Cognitive Function:  Yes  Memory Problems:  Yes    Current Outpatient Medications   Medication Sig Dispense Refill     finasteride (PROSCAR) 5 MG tablet Take 5 mg by mouth daily.      levETIRAcetam (KEPPRA) 1000 MG tablet Take 1 tablet (1,000 mg) by mouth 2 times daily. Take 1 tab PO AM, 2 tabs PO  tablet 1    tamsulosin (FLOMAX) 0.4 MG capsule Take 0.4 mg by mouth daily.         Perceived AED Side Effects: No    Medication Notes:   AED Medication Compliance:  compliant all of the time  Using a pill box:  No    Past AEDs:      5/5/2025     1:30 PM   AED - ANTIEPILEPTIC DRUGS   levETIRAcetam 1,000 mg BID Take 1 tab PO AM, 2 tabs PO PM PO           Medication marked as long-term         Exam:    /72 (BP Location: Right arm, Patient Position: Sitting, Cuff Size: Adult Large)   Pulse 74   Temp 98.1  F (36.7  C) (Temporal)   Resp 19   Wt 184 lb 6.4 oz (83.6 kg)   SpO2 98%   BMI 26.84 kg/m       Wt Readings from Last 5 Encounters:   05/05/25 184 lb 6.4 oz (83.6 kg)   03/07/25 189 lb (85.7 kg)   02/12/25 182 lb 3.2 oz (82.6 kg)   01/11/22 180 lb 9.6 oz (81.9 kg)   10/22/21 180 lb 8 oz (81.9 kg)       General Appearance: Normal  Gait:  Normal  Attention Span:  Normal  Language:  Normal  Extraocular Movements:  Normal  Coordination:  Normal  Visual Fields:  Normal  Facial Sensation:  Normal  Facial Strength:  Normal  Tongue Strength:  Normal  Limb Strength:  Normal  Limb Tone:  Normal  Limb Sensation:  Normal  General Physical Findings:  Normal    Assessment and Plan:     - NON-INTRACTABLE Multifocal /TEMPORAL LOBE epilepsy with imaging suggesting bitemporal (left >>> right) abnormality   - Complex partial seizures, with at least six episodes in the last year.  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  - Likely under reporting seizure burden  Risk of SUDEP high    PLAN:  - We had previously recommended that the patient increase Keppra dosage to 1 gram in the morning and 2 grams at night to manage seizures more effectively. This proposed  adjustment would aim to minimize side effects by taking a higher dose at night when the patient is sleeping (pt continues to be NOT KEEN AND WOULD LIKE TO CONTINUE 1 GM BID)  - Enforce a driving restriction for three months due to the risk of seizures. The patient is advised not to drive during this period to comply with state law and prevent potential accidents.  - Schedule follow-up appointments every six months to monitor seizure activity and adjust treatment as necessary.  - Provide a prescription for the adjusted Keppra dosage and ensure the patient has an adequate supply. In future consider adding/changing to other AEDS like Trileptal/Tegretol/ LCM ( restriction with insurance/finance)  - Encourage the patient to track seizure occurrences and report them by phone to adjust medication if needed.  - Recommend adjusting work hours to allow for better sleep and access to transportation, supporting overall health and seizure management.      Next appointment(s): - Follow-up appointment in six months to assess seizure control.    Thank you again for your visit, and we look forward to supporting you in your journey to better health.    Discussed with attending neurologist Dr. Thomas. I agree with note and have seen the pt.     Signed,    Siddhartha Birmingham MD  Neurology, PGY3

## 2025-05-07 NOTE — TELEPHONE ENCOUNTER
Patient met with .  Cancel inpatient monitoring at this time, patient does not want to proceed with the inpatient stay